# Patient Record
Sex: FEMALE | Race: WHITE | NOT HISPANIC OR LATINO
[De-identification: names, ages, dates, MRNs, and addresses within clinical notes are randomized per-mention and may not be internally consistent; named-entity substitution may affect disease eponyms.]

---

## 2017-03-24 ENCOUNTER — APPOINTMENT (OUTPATIENT)
Dept: ORTHOPEDIC SURGERY | Facility: CLINIC | Age: 72
End: 2017-03-24

## 2017-05-24 ENCOUNTER — APPOINTMENT (OUTPATIENT)
Dept: ORTHOPEDIC SURGERY | Facility: CLINIC | Age: 72
End: 2017-05-24

## 2017-06-12 ENCOUNTER — APPOINTMENT (OUTPATIENT)
Dept: ORTHOPEDIC SURGERY | Facility: CLINIC | Age: 72
End: 2017-06-12

## 2017-09-20 ENCOUNTER — APPOINTMENT (OUTPATIENT)
Dept: ORTHOPEDIC SURGERY | Facility: CLINIC | Age: 72
End: 2017-09-20
Payer: MEDICARE

## 2017-09-20 PROCEDURE — 99214 OFFICE O/P EST MOD 30 MIN: CPT

## 2017-12-20 ENCOUNTER — APPOINTMENT (OUTPATIENT)
Dept: ORTHOPEDIC SURGERY | Facility: CLINIC | Age: 72
End: 2017-12-20
Payer: MEDICARE

## 2017-12-20 VITALS — HEART RATE: 76 BPM | SYSTOLIC BLOOD PRESSURE: 106 MMHG | DIASTOLIC BLOOD PRESSURE: 69 MMHG

## 2017-12-20 PROCEDURE — 99214 OFFICE O/P EST MOD 30 MIN: CPT

## 2018-02-05 ENCOUNTER — OUTPATIENT (OUTPATIENT)
Dept: OUTPATIENT SERVICES | Facility: HOSPITAL | Age: 73
LOS: 1 days | End: 2018-02-05

## 2018-02-12 ENCOUNTER — INPATIENT (INPATIENT)
Facility: HOSPITAL | Age: 73
LOS: 1 days | Discharge: HOME HEALTH | End: 2018-02-14
Payer: MEDICARE

## 2018-02-12 PROCEDURE — 73501 X-RAY EXAM HIP UNI 1 VIEW: CPT | Mod: 26,RT

## 2018-02-13 ENCOUNTER — OUTPATIENT (OUTPATIENT)
Dept: OUTPATIENT SERVICES | Facility: HOSPITAL | Age: 73
LOS: 1 days | End: 2018-02-13

## 2018-03-21 ENCOUNTER — APPOINTMENT (OUTPATIENT)
Dept: ORTHOPEDIC SURGERY | Facility: CLINIC | Age: 73
End: 2018-03-21

## 2018-03-28 ENCOUNTER — APPOINTMENT (OUTPATIENT)
Dept: ORTHOPEDIC SURGERY | Facility: CLINIC | Age: 73
End: 2018-03-28
Payer: MEDICARE

## 2018-03-28 PROCEDURE — 99214 OFFICE O/P EST MOD 30 MIN: CPT

## 2018-06-20 ENCOUNTER — APPOINTMENT (OUTPATIENT)
Dept: ORTHOPEDIC SURGERY | Facility: CLINIC | Age: 73
End: 2018-06-20
Payer: MEDICARE

## 2018-06-20 PROCEDURE — 99214 OFFICE O/P EST MOD 30 MIN: CPT

## 2018-11-14 ENCOUNTER — APPOINTMENT (OUTPATIENT)
Dept: ORTHOPEDIC SURGERY | Facility: CLINIC | Age: 73
End: 2018-11-14
Payer: MEDICARE

## 2018-11-14 VITALS
BODY MASS INDEX: 38.98 KG/M2 | HEIGHT: 63 IN | HEART RATE: 80 BPM | WEIGHT: 220 LBS | DIASTOLIC BLOOD PRESSURE: 73 MMHG | SYSTOLIC BLOOD PRESSURE: 116 MMHG

## 2018-11-14 PROCEDURE — 99214 OFFICE O/P EST MOD 30 MIN: CPT

## 2019-04-17 ENCOUNTER — APPOINTMENT (OUTPATIENT)
Dept: ORTHOPEDIC SURGERY | Facility: CLINIC | Age: 74
End: 2019-04-17
Payer: MEDICARE

## 2019-04-17 PROCEDURE — 99214 OFFICE O/P EST MOD 30 MIN: CPT

## 2019-10-23 ENCOUNTER — APPOINTMENT (OUTPATIENT)
Dept: ORTHOPEDIC SURGERY | Facility: CLINIC | Age: 74
End: 2019-10-23
Payer: MEDICARE

## 2019-10-23 PROCEDURE — 99214 OFFICE O/P EST MOD 30 MIN: CPT

## 2019-10-23 NOTE — HISTORY OF PRESENT ILLNESS
[Pain] : pain [5] : currently ~his/her~ pain is 5 out of 10 [Worsening] : worsening [All Other ROS Normal] : All other review of systems are negative except as noted [All Hx] : past medical, family, and social [All] : medication and allergy [FreeTextEntry1] : Neck discomfort [FreeTextEntry2] : Ms. Altman returns for followup.  Overall she is feeling well.  Her back has been feeling good.

## 2019-10-23 NOTE — PHYSICAL EXAM
[Poor Appearance] : well-appearing [Acute Distress] : not in acute distress [Obese] : not obese [Antalgic] : not antalgic [FreeTextEntry2] : Examination of the cervical spine reveals no midline or paraspinal tenderness to palpation. No cervical lymphadenopathy. Decreased range of motion with respect to flexion, extension, rotation, and lateral bending. Negative Spurlings. Negative Lhermitte's. Full range of motion bilateral shoulders without evidence of impingement. Cranial nerves II through XII grossly intact. Intact sensation bilateral upper extremities. She does have some decreased strength of her right bicep and tricep which are 4/5 as well as decreased  strength which is 5-//5. 1+ biceps triceps and brachioradialis reflexes. Negative Ramesh's. 2+ radial pulse. Negative Tinel's over the cubital and carpal tunnel. No skin lesions on the right and left upper extremities.\par Examination of the thoracic and lumbar spine reveals no midline tenderness palpation, step-offs, or skin lesions. Decreased range of motion with respect to flexion, extension, lateral bending, and rotation. No tenderness to palpation of the sciatic notch. No tenderness palpation of the bilateral greater trochanters. No pain with passive internal/external rotation of the hips. Negative VENANCIO. Negative straight leg raise bilaterally. No bowstring. Negative femoral stretch. 5 out of 5 iliopsoas, hip abductors, hips adductors, quadriceps, hamstrings, gastrocsoleus, tibialis anterior, extensor hallucis longus, peroneals. Grossly intact sensation to light touch bilateral lower extremities. 1+ patellar and Achilles reflexes. Downgoing Babinski. No clonus. Intact proprioception. Palpable pulses. No skin lesion and no edema on the right and left lower extremities. [de-identified] : Recent PET scan does not does demonstrate gross change in bony metastases

## 2019-10-23 NOTE — DISCUSSION/SUMMARY
[de-identified] : Mr. her spine overall stable based on her PET scan. She does have some symptomatology of her right hand that appears consistent with some carpal tunnel or peripheral neuropathy. I recommend hand surgery evaluation for this. Follow up afterwards.

## 2019-11-05 ENCOUNTER — APPOINTMENT (OUTPATIENT)
Dept: ORTHOPEDIC SURGERY | Facility: CLINIC | Age: 74
End: 2019-11-05
Payer: MEDICARE

## 2019-11-05 VITALS
DIASTOLIC BLOOD PRESSURE: 58 MMHG | HEIGHT: 61 IN | HEART RATE: 101 BPM | BODY MASS INDEX: 33.99 KG/M2 | SYSTOLIC BLOOD PRESSURE: 99 MMHG | WEIGHT: 180 LBS

## 2019-11-05 DIAGNOSIS — Z78.9 OTHER SPECIFIED HEALTH STATUS: ICD-10-CM

## 2019-11-05 DIAGNOSIS — G56.01 CARPAL TUNNEL SYNDROME, RIGHT UPPER LIMB: ICD-10-CM

## 2019-11-05 DIAGNOSIS — M18.11 UNILATERAL PRIMARY OSTEOARTHRITIS OF FIRST CARPOMETACARPAL JOINT, RIGHT HAND: ICD-10-CM

## 2019-11-05 DIAGNOSIS — Z85.830 PERSONAL HISTORY OF MALIGNANT NEOPLASM OF BONE: ICD-10-CM

## 2019-11-05 PROCEDURE — 73110 X-RAY EXAM OF WRIST: CPT | Mod: RT

## 2019-11-05 PROCEDURE — 99214 OFFICE O/P EST MOD 30 MIN: CPT

## 2019-11-05 RX ORDER — MELOXICAM 7.5 MG/1
7.5 TABLET ORAL DAILY
Qty: 30 | Refills: 3 | Status: ACTIVE | COMMUNITY
Start: 2019-11-05 | End: 1900-01-01

## 2020-02-19 ENCOUNTER — APPOINTMENT (OUTPATIENT)
Dept: ORTHOPEDIC SURGERY | Facility: CLINIC | Age: 75
End: 2020-02-19
Payer: MEDICARE

## 2020-02-19 PROCEDURE — 99214 OFFICE O/P EST MOD 30 MIN: CPT

## 2020-02-19 NOTE — HISTORY OF PRESENT ILLNESS
[Worsening] : worsening [Pain] : pain [All Hx] : past medical, family, and social [All Other ROS Normal] : All other review of systems are negative except as noted [5] : currently ~his/her~ pain is 5 out of 10 [All] : medication and allergy [FreeTextEntry1] : Neck discomfort [FreeTextEntry2] : Ms. Altman returns for followup.  Overall she is feeling well.  Her back has been feeling good. Patient has been having pain at her neck which radiates to shoulders. Was seen by her oncologist which recommended a PET and bone scan. Bone scan was positive for new focus at the body of sternum, but otherwise stable mets (non involving neck or shoulder). Patient also had PET scan done.

## 2020-02-19 NOTE — DISCUSSION/SUMMARY
[de-identified] : Given her worsening symptoms an MRI of her cervical spine will be obtained. Follow up afterwards.

## 2020-02-19 NOTE — PHYSICAL EXAM
[Poor Appearance] : well-appearing [Acute Distress] : not in acute distress [Obese] : not obese [Antalgic] : not antalgic [FreeTextEntry2] : Examination of the cervical spine reveals no midline or paraspinal tenderness to palpation. No cervical lymphadenopathy. Decreased range of motion with respect to flexion, extension, rotation, and lateral bending. Negative Spurlings. Negative Lhermitte's. Full range of motion bilateral shoulders without evidence of impingement. Cranial nerves II through XII grossly intact. Intact sensation bilateral upper extremities. She does have some decreased strength of her right bicep and tricep which are 4/5 as well as decreased  strength which is 5-//5. 1+ biceps triceps and brachioradialis reflexes. Negative Ramesh's. 2+ radial pulse. Negative Tinel's over the cubital and carpal tunnel. No skin lesions on the right and left upper extremities.\par Examination of the thoracic and lumbar spine reveals no midline tenderness palpation, step-offs, or skin lesions. Decreased range of motion with respect to flexion, extension, lateral bending, and rotation. No tenderness to palpation of the sciatic notch. No tenderness palpation of the bilateral greater trochanters. No pain with passive internal/external rotation of the hips. Negative VENANCIO. Negative straight leg raise bilaterally. No bowstring. Negative femoral stretch. 5 out of 5 iliopsoas, hip abductors, hips adductors, quadriceps, hamstrings, gastrocsoleus, tibialis anterior, extensor hallucis longus, peroneals. Grossly intact sensation to light touch bilateral lower extremities. 1+ patellar and Achilles reflexes. Downgoing Babinski. No clonus. Intact proprioception. Palpable pulses. No skin lesion and no edema on the right and left lower extremities. [de-identified] : Recent PET scan does not does demonstrate gross change in bony metastases\par \par DATE OF EXAM: 02/13/2020\par NM PET/CT SKULL BASE TO MID THIGH\par FINDINGS: SUV is normalized to body weight and is reported as SUV maximum. Liver\par background SUV mean/average, as a reference for comparing FDG studies is 2.6. Aortic blood\par pool baseline activity, as a reference for comparing relative uptake, SUV mean/average\par 2.0.\par Head/Neck: Physiologic activity in the visualized regions of the brain, major salivary\par glands and pharynx. Neck laryngeal and thyroid gland uptake within normal limits.\par Multinodular thyroid. No evidence of cervical atypical lymph node activity or enlargement\par identified.\par Chest: Lung fields without abnormal localized activity or newly developing pulmonary\par nodule, consolidation or infiltrate. No suspicious focal hilar and mediastinal lymph node\par atypical uptake or enlargement. Normal variable physiologic uptake within the heart and\par great vessels. No discrete supraclavicular, axillary or chest wall activity noted. Left\par breast post treatment nonfocal low-level uptake.\par Abdomen: Hepatobiliary system homogeneous activity without suspicious focal uptake.\par Moderate diffuse fatty infiltration of the liver. Hepatosplenic uptake max SUV ratio is\par 4.0/4.2. Splenic activity within normal limits. Pancreas, adrenal glands and kidneys\par unremarkable. Small left renal cyst. Atherosclerotic nonaneurysmal distal ectatic aorta\par 2.3 cm. Remainder of retroperitoneal and mesenteric structures are normal. Bowel,\par including stomach, duodenum, small bowel and large bowel demonstrating normal variable\par physiologic activity.\par Pelvis: Normal radiotracer distribution throughout the visualized visceral organs, lymph\par Page 2 of 2\par node chains and remainder of the bowel structures.\par Musculoskeletal: Visualized osseous structures reveal multiple scattered sclerotic\par densities with variable heterogeneous uptake representative C2 max SUV from 2.9 up to 3.5,\par T3 max SUV from 3.7 up to 4.8, left anterior fifth rib max SUV from 4.5 up to 5.0, L2 max\par SUV from 9.8 down to 8.7, left posterolateral L4 max SUV from 4.1 up to 4.8 and left iliac\par spine max SUV from 2.6 up to 3.1, left posterior acetabulum max SUV from 4.5 up to 4.7.\par Bilateral total hip replacements. Overlying muscular soft tissues demonstrate normal\par physiologic distribution of activity with right subscapular exertional uptake.\par IMPRESSION:\par 1. Mild continued interval progression in scattered osseous hypermetabolic foci since\par 7/11/2019.\par \par EXAM: 02/07/2020\par NM BONE SCAN TOTAL BODY\par SCINTIGRAPHIC FINDINGS: There are again multiple areas of increased uptake.\par Findings at the calvarium at the vertex and LEFT occiput appears stable.\par LEFT glenoid, distal humerus and clavicle are stable. There is a NEW focus involving the\par body of the sternum.\par Previously evident BILATERAL rib foci are largely unchanged.\par Abnormalities at multiple levels of the thoracic and lumbar spine with the largest lumbar\par focus at L3 are unchanged.\par Abnormalities pelvic bones including the pelvic bones and proximal lower extremities RIGHT\par hemisacrum, foci involving LEFT ischium and BILATERAL trochanteric and femoral neck\par regions, BILATERAL distal femora appear largely unchanged.\par Uptake suggestive of degenerative joint disease is seen in shoulders. There are findings\par suggestive of dental disease. There is NO abnormal soft tissue activity. There is surface\par contamination of radiotracer at the perineum. Physiologic excretion is seen in the kidneys\par and urinary bladder.\par IMPRESSION: There is a NEW focus involving the body of the sternum. Otherwise stable\par osseous metastases involving the axial and appendicular skeleton elsewhere.

## 2020-02-24 ENCOUNTER — APPOINTMENT (OUTPATIENT)
Dept: ORTHOPEDIC SURGERY | Facility: CLINIC | Age: 75
End: 2020-02-24
Payer: MEDICARE

## 2020-02-24 PROCEDURE — 99214 OFFICE O/P EST MOD 30 MIN: CPT

## 2020-02-24 RX ORDER — PREDNISONE 20 MG/1
20 TABLET ORAL DAILY
Qty: 18 | Refills: 0 | Status: ACTIVE | COMMUNITY
Start: 2020-02-24 | End: 1900-01-01

## 2020-02-24 NOTE — DISCUSSION/SUMMARY
[de-identified] : I made it feel at this point appears to be more cervical radiculopathy. She will try prednisone taper. Should this fail to alleviate her symptoms she may be a candidate for epidural injections

## 2020-02-24 NOTE — PHYSICAL EXAM
[Poor Appearance] : well-appearing [Obese] : not obese [Acute Distress] : not in acute distress [Antalgic] : not antalgic [FreeTextEntry2] : Examination of the cervical spine reveals no midline or paraspinal tenderness to palpation. No cervical lymphadenopathy. Decreased range of motion with respect to flexion, extension, rotation, and lateral bending. Negative Spurlings. Negative Lhermitte's. Full range of motion bilateral shoulders without evidence of impingement. Cranial nerves II through XII grossly intact. Intact sensation bilateral upper extremities. She does have some decreased strength of her right bicep and tricep which are 4/5 as well as decreased  strength which is 5-//5. 1+ biceps triceps and brachioradialis reflexes. Negative Ramesh's. 2+ radial pulse. Negative Tinel's over the cubital and carpal tunnel. No skin lesions on the right and left upper extremities.\par Examination of the thoracic and lumbar spine reveals no midline tenderness palpation, step-offs, or skin lesions. Decreased range of motion with respect to flexion, extension, lateral bending, and rotation. No tenderness to palpation of the sciatic notch. No tenderness palpation of the bilateral greater trochanters. No pain with passive internal/external rotation of the hips. Negative VENANCIO. Negative straight leg raise bilaterally. No bowstring. Negative femoral stretch. 5 out of 5 iliopsoas, hip abductors, hips adductors, quadriceps, hamstrings, gastrocsoleus, tibialis anterior, extensor hallucis longus, peroneals. Grossly intact sensation to light touch bilateral lower extremities. 1+ patellar and Achilles reflexes. Downgoing Babinski. No clonus. Intact proprioception. Palpable pulses. No skin lesion and no edema on the right and left lower extremities. [de-identified] : MRI of the cervical spine reveals increased stenosis at C4-5. Stable C2 metastatic lesion with questionable increased size at T2 but no obvious epidural extension

## 2020-02-24 NOTE — HISTORY OF PRESENT ILLNESS
[Pain] : pain [Worsening] : worsening [5] : currently ~his/her~ pain is 5 out of 10 [All Other ROS Normal] : All other review of systems are negative except as noted [All Hx] : past medical, family, and social [All] : medication and allergy [FreeTextEntry1] : Neck discomfort [FreeTextEntry2] : Ms. Altman returns for followup.  Overall she is feeling well.  Her back has been feeling good. Patient has been having pain at her neck which radiates to shoulders. Was seen by her oncologist which recommended a PET and bone scan. Bone scan was positive for new focus at the body of sternum, but otherwise stable mets (non involving neck or shoulder). Patient also had PET scan done.

## 2020-04-08 ENCOUNTER — APPOINTMENT (OUTPATIENT)
Dept: ORTHOPEDIC SURGERY | Facility: CLINIC | Age: 75
End: 2020-04-08

## 2020-06-12 ENCOUNTER — APPOINTMENT (OUTPATIENT)
Dept: ORTHOPEDIC SURGERY | Facility: CLINIC | Age: 75
End: 2020-06-12
Payer: MEDICARE

## 2020-06-12 VITALS
WEIGHT: 180 LBS | HEART RATE: 89 BPM | DIASTOLIC BLOOD PRESSURE: 63 MMHG | SYSTOLIC BLOOD PRESSURE: 95 MMHG | HEIGHT: 61 IN | BODY MASS INDEX: 33.99 KG/M2

## 2020-06-12 VITALS — TEMPERATURE: 99.8 F

## 2020-06-12 PROCEDURE — 99214 OFFICE O/P EST MOD 30 MIN: CPT

## 2020-06-12 NOTE — HISTORY OF PRESENT ILLNESS
[de-identified] : Patient follows up for her cervical spine.\par Last seen in office on 2/24/2020 when she was given a Prednisone taper for cervical radiculopathy and stable C4-5 stenosis.\par She said this medication was taken for about 7-10 days, and noted great improvements of her pain when taking it.\par Her pain has since returned though. She denies any new symptoms or complaints.

## 2020-06-12 NOTE — DISCUSSION/SUMMARY
[de-identified] : She will be evaluated for possible epidural injections.  Follow-up afterwards.he will be evaluated for possible epidural injections. Follow up afterwards.

## 2020-06-12 NOTE — PHYSICAL EXAM
[Poor Appearance] : well-appearing [Acute Distress] : not in acute distress [Obese] : not obese [Antalgic] : not antalgic [FreeTextEntry2] : Examination of the cervical spine reveals no midline or paraspinal tenderness to palpation. No cervical lymphadenopathy. Decreased range of motion with respect to flexion, extension, rotation, and lateral bending. Negative Spurlings. Negative Lhermitte's. Full range of motion bilateral shoulders without evidence of impingement. Cranial nerves II through XII grossly intact. Intact sensation bilateral upper extremities. She does have some decreased strength of her right bicep and tricep which are 4/5 as well as decreased  strength which is 5-//5. 1+ biceps triceps and brachioradialis reflexes. Negative Ramesh's. 2+ radial pulse. Negative Tinel's over the cubital and carpal tunnel. No skin lesions on the right and left upper extremities.\par Examination of the thoracic and lumbar spine reveals no midline tenderness palpation, step-offs, or skin lesions. Decreased range of motion with respect to flexion, extension, lateral bending, and rotation. No tenderness to palpation of the sciatic notch. No tenderness palpation of the bilateral greater trochanters. No pain with passive internal/external rotation of the hips. Negative VENANCIO. Negative straight leg raise bilaterally. No bowstring. Negative femoral stretch. 5 out of 5 iliopsoas, hip abductors, hips adductors, quadriceps, hamstrings, gastrocsoleus, tibialis anterior, extensor hallucis longus, peroneals. Grossly intact sensation to light touch bilateral lower extremities. 1+ patellar and Achilles reflexes. Downgoing Babinski. No clonus. Intact proprioception. Palpable pulses. No skin lesion and no edema on the right and left lower extremities. [de-identified] : MRI of the cervical spine reveals increased stenosis at C4-5. Stable C2 metastatic lesion with questionable increased size at T2 but no obvious epidural extension

## 2020-09-14 ENCOUNTER — APPOINTMENT (OUTPATIENT)
Dept: ORTHOPEDIC SURGERY | Facility: CLINIC | Age: 75
End: 2020-09-14
Payer: MEDICARE

## 2020-09-14 VITALS — WEIGHT: 180 LBS | TEMPERATURE: 98.2 F | HEIGHT: 61 IN | BODY MASS INDEX: 33.99 KG/M2

## 2020-09-14 PROCEDURE — 99214 OFFICE O/P EST MOD 30 MIN: CPT

## 2020-09-14 NOTE — PHYSICAL EXAM
[Poor Appearance] : well-appearing [Obese] : not obese [Acute Distress] : not in acute distress [Antalgic] : not antalgic [FreeTextEntry2] : Examination of the cervical spine reveals no midline or paraspinal tenderness to palpation. No cervical lymphadenopathy. Decreased range of motion with respect to flexion, extension, rotation, and lateral bending. Negative Spurlings. Negative Lhermitte's. Full range of motion bilateral shoulders without evidence of impingement. Cranial nerves II through XII grossly intact. Intact sensation bilateral upper extremities.  She has overall improved strength in her arm.  1+ biceps triceps and brachioradialis reflexes. Negative Ramesh's. 2+ radial pulse. Negative Tinel's over the cubital and carpal tunnel. No skin lesions on the right and left upper extremities.\par Examination of the thoracic and lumbar spine reveals no midline tenderness palpation, step-offs, or skin lesions. Decreased range of motion with respect to flexion, extension, lateral bending, and rotation. No tenderness to palpation of the sciatic notch. No tenderness palpation of the bilateral greater trochanters. No pain with passive internal/external rotation of the hips. Negative VENANCIO. Negative straight leg raise bilaterally. No bowstring. Negative femoral stretch. 5 out of 5 iliopsoas, hip abductors, hips adductors, quadriceps, hamstrings, gastrocsoleus, tibialis anterior, extensor hallucis longus, peroneals. Grossly intact sensation to light touch bilateral lower extremities. 1+ patellar and Achilles reflexes. Downgoing Babinski. No clonus. Intact proprioception. Palpable pulses. No skin lesion and no edema on the right and left lower extremities. [de-identified] : MRI of the cervical spine reveals increased stenosis at C4-5. Stable C2 metastatic lesion with questionable increased size at T2 but no obvious epidural extension\par \par Review of her recent PET scan demonstrates stable osseous metastasis

## 2020-09-14 NOTE — DISCUSSION/SUMMARY
[de-identified] : We discussed further treatment options.  Overall her symptoms are stable and improved with the epidural injections.  She will follow-up with me in 3 to 6 months.  She will let me know of any interval changes or worsening of her symptoms.

## 2020-09-14 NOTE — HISTORY OF PRESENT ILLNESS
[de-identified] : Ms. Altman presents to the office for a follow-up visit.   She has done 2 RACIEL's for her neck.  She feels about 40 % better.  She continues to have tingling and numbness down her left arm and has difficulty using her left hand.  She has done a recent bone and PET scan which are stable.  She is being treated for an infection in her mouth.

## 2020-12-09 ENCOUNTER — APPOINTMENT (OUTPATIENT)
Dept: ORTHOPEDIC SURGERY | Facility: CLINIC | Age: 75
End: 2020-12-09
Payer: MEDICARE

## 2020-12-09 VITALS — HEART RATE: 83 BPM | DIASTOLIC BLOOD PRESSURE: 70 MMHG | SYSTOLIC BLOOD PRESSURE: 109 MMHG

## 2020-12-09 VITALS — TEMPERATURE: 96.7 F

## 2020-12-09 PROCEDURE — 99214 OFFICE O/P EST MOD 30 MIN: CPT

## 2020-12-09 NOTE — PHYSICAL EXAM
[Poor Appearance] : well-appearing [Acute Distress] : not in acute distress [Obese] : not obese [Ataxic] : ataxic [Antalgic] : not antalgic [FreeTextEntry2] : Examination of the cervical spine reveals no midline or paraspinal tenderness to palpation. No cervical lymphadenopathy. Decreased range of motion with respect to flexion, extension, rotation, and lateral bending. Negative Spurlings. Negative Lhermitte's. Full range of motion bilateral shoulders without evidence of impingement. Cranial nerves II through XII grossly intact. Intact sensation bilateral upper extremities.  On today's examination she appears to have decreased right-sided  strength and biceps.  1+ biceps triceps and brachioradialis reflexes.  Positive Ramesh's. 2+ radial pulse. Negative Tinel's over the cubital and carpal tunnel. No skin lesions on the right and left upper extremities.\par Examination of the thoracic and lumbar spine reveals no midline tenderness palpation, step-offs, or skin lesions. Decreased range of motion with respect to flexion, extension, lateral bending, and rotation. No tenderness to palpation of the sciatic notch. No tenderness palpation of the bilateral greater trochanters. No pain with passive internal/external rotation of the hips. Negative VENANCIO. Negative straight leg raise bilaterally. No bowstring. Negative femoral stretch. 5 out of 5 iliopsoas, hip abductors, hips adductors, quadriceps, hamstrings, gastrocsoleus, tibialis anterior, extensor hallucis longus, peroneals. Grossly intact sensation to light touch bilateral lower extremities. 1+ patellar and Achilles reflexes. Downgoing Babinski. No clonus. Intact proprioception. Palpable pulses. No skin lesion and no edema on the right and left lower extremities. [de-identified] : MRI of the cervical spine reveals increased stenosis at C4-5. Stable C2 metastatic lesion with questionable increased size at T2 but no obvious epidural extension\par \par Review of her recent PET scan demonstrates stable osseous metastasis

## 2020-12-09 NOTE — HISTORY OF PRESENT ILLNESS
[de-identified] : Ms. Altman presents to the office for a follow-up visit. She continues to have tingling and numbness down her right arm.  At times her hand will feel completely numb.  When she wakes up in the morning her right arm feels heavy.

## 2020-12-09 NOTE — DISCUSSION/SUMMARY
[de-identified] : Given her worsening symptoms I have recommended an updated cervical spine MRI.  Follow-up afterwards.

## 2020-12-17 ENCOUNTER — APPOINTMENT (OUTPATIENT)
Dept: ORTHOPEDIC SURGERY | Facility: CLINIC | Age: 75
End: 2020-12-17
Payer: MEDICARE

## 2020-12-17 PROCEDURE — 99442: CPT | Mod: 95

## 2021-01-07 ENCOUNTER — OUTPATIENT (OUTPATIENT)
Dept: OUTPATIENT SERVICES | Facility: HOSPITAL | Age: 76
LOS: 1 days | End: 2021-01-07
Payer: MEDICARE

## 2021-01-07 VITALS
HEIGHT: 64.5 IN | WEIGHT: 188.05 LBS | RESPIRATION RATE: 16 BRPM | DIASTOLIC BLOOD PRESSURE: 72 MMHG | TEMPERATURE: 97 F | OXYGEN SATURATION: 99 % | HEART RATE: 80 BPM | SYSTOLIC BLOOD PRESSURE: 116 MMHG

## 2021-01-07 DIAGNOSIS — M48.02 SPINAL STENOSIS, CERVICAL REGION: ICD-10-CM

## 2021-01-07 DIAGNOSIS — Z98.890 OTHER SPECIFIED POSTPROCEDURAL STATES: Chronic | ICD-10-CM

## 2021-01-07 DIAGNOSIS — Z90.710 ACQUIRED ABSENCE OF BOTH CERVIX AND UTERUS: Chronic | ICD-10-CM

## 2021-01-07 DIAGNOSIS — Z96.649 PRESENCE OF UNSPECIFIED ARTIFICIAL HIP JOINT: Chronic | ICD-10-CM

## 2021-01-07 DIAGNOSIS — Z98.49 CATARACT EXTRACTION STATUS, UNSPECIFIED EYE: Chronic | ICD-10-CM

## 2021-01-07 DIAGNOSIS — I10 ESSENTIAL (PRIMARY) HYPERTENSION: ICD-10-CM

## 2021-01-07 LAB
ANION GAP SERPL CALC-SCNC: 10 MMOL/L — SIGNIFICANT CHANGE UP (ref 7–14)
BLD GP AB SCN SERPL QL: NEGATIVE — SIGNIFICANT CHANGE UP
BUN SERPL-MCNC: 18 MG/DL — SIGNIFICANT CHANGE UP (ref 7–23)
CALCIUM SERPL-MCNC: 10.5 MG/DL — SIGNIFICANT CHANGE UP (ref 8.4–10.5)
CHLORIDE SERPL-SCNC: 99 MMOL/L — SIGNIFICANT CHANGE UP (ref 98–107)
CO2 SERPL-SCNC: 30 MMOL/L — SIGNIFICANT CHANGE UP (ref 22–31)
CREAT SERPL-MCNC: 1.34 MG/DL — HIGH (ref 0.5–1.3)
GLUCOSE SERPL-MCNC: 113 MG/DL — HIGH (ref 70–99)
HCT VFR BLD CALC: 30.1 % — LOW (ref 34.5–45)
HGB BLD-MCNC: 10.2 G/DL — LOW (ref 11.5–15.5)
MCHC RBC-ENTMCNC: 33.8 PG — SIGNIFICANT CHANGE UP (ref 27–34)
MCHC RBC-ENTMCNC: 33.9 GM/DL — SIGNIFICANT CHANGE UP (ref 32–36)
MCV RBC AUTO: 99.7 FL — SIGNIFICANT CHANGE UP (ref 80–100)
NRBC # BLD: 0 /100 WBCS — SIGNIFICANT CHANGE UP
NRBC # FLD: 0 K/UL — SIGNIFICANT CHANGE UP
PLATELET # BLD AUTO: 251 K/UL — SIGNIFICANT CHANGE UP (ref 150–400)
POTASSIUM SERPL-MCNC: 3.5 MMOL/L — SIGNIFICANT CHANGE UP (ref 3.5–5.3)
POTASSIUM SERPL-SCNC: 3.5 MMOL/L — SIGNIFICANT CHANGE UP (ref 3.5–5.3)
RBC # BLD: 3.02 M/UL — LOW (ref 3.8–5.2)
RBC # FLD: 13.3 % — SIGNIFICANT CHANGE UP (ref 10.3–14.5)
RH IG SCN BLD-IMP: POSITIVE — SIGNIFICANT CHANGE UP
SODIUM SERPL-SCNC: 139 MMOL/L — SIGNIFICANT CHANGE UP (ref 135–145)
WBC # BLD: 4.07 K/UL — SIGNIFICANT CHANGE UP (ref 3.8–10.5)
WBC # FLD AUTO: 4.07 K/UL — SIGNIFICANT CHANGE UP (ref 3.8–10.5)

## 2021-01-07 PROCEDURE — 93010 ELECTROCARDIOGRAM REPORT: CPT

## 2021-01-07 RX ORDER — METOPROLOL TARTRATE 50 MG
1 TABLET ORAL
Qty: 0 | Refills: 0 | DISCHARGE

## 2021-01-07 RX ORDER — SODIUM CHLORIDE 9 MG/ML
3 INJECTION INTRAMUSCULAR; INTRAVENOUS; SUBCUTANEOUS EVERY 8 HOURS
Refills: 0 | Status: DISCONTINUED | OUTPATIENT
Start: 2021-01-19 | End: 2021-01-22

## 2021-01-07 RX ORDER — SODIUM CHLORIDE 9 MG/ML
1000 INJECTION, SOLUTION INTRAVENOUS
Refills: 0 | Status: DISCONTINUED | OUTPATIENT
Start: 2021-01-19 | End: 2021-01-20

## 2021-01-07 NOTE — H&P PST ADULT - NSICDXPASTMEDICALHX_GEN_ALL_CORE_FT
PAST MEDICAL HISTORY:  Cancer of spine     HTN (hypertension)     Spinal stenosis, cervical region

## 2021-01-07 NOTE — H&P PST ADULT - HISTORY OF PRESENT ILLNESS
76 y/o female HTN presents to PST preop for C3-6 laminectomy with fusion on 1/19/21. preop dx of spinal stenosis, cervical region.   pt diagnosed with spine cancer 3 years ago. s/p multiple radiation treatments. pt states a recent MRI revealed worsening spinal stenosis. now for surgery.  pt reports chronic neck pain that radiates down her right arm causing numbness, tingling and weakness

## 2021-01-07 NOTE — H&P PST ADULT - NSICDXPROBLEM_GEN_ALL_CORE_FT
PROBLEM DIAGNOSES  Problem: Spinal stenosis, cervical region  Assessment and Plan: preop for c3-6 laminectomy and fusion on 1/19/21  preop instructions given, pt verbalized understanding  GI prophylaxis and chlorhexidine wash provided  pt is scheduled for COVID testing preop  medical clearance requested- pt will see her PCP on 1/13/21 for MC      Problem: HTN (hypertension)  Assessment and Plan: pt will take blood pressure meds Lisinorpil  AM of surgery as prescribed

## 2021-01-07 NOTE — H&P PST ADULT - NEGATIVE NEUROLOGICAL SYMPTOMS
no transient paralysis/no generalized seizures/no syncope/no tremors/no vertigo/no loss of sensation/no difficulty walking/no headache/no loss of consciousness/no hemiparesis/no confusion/no facial palsy

## 2021-01-07 NOTE — H&P PST ADULT - NSICDXPASTSURGICALHX_GEN_ALL_CORE_FT
PAST SURGICAL HISTORY:  H/O carpal tunnel repair left    H/O eye surgery repair of hole in left retina    History of back surgery lumbar spine    S/P cataract extraction b/l    S/P hip replacement b/l    S/P hysterectomy

## 2021-01-08 LAB
MRSA PCR RESULT.: SIGNIFICANT CHANGE UP
S AUREUS DNA NOSE QL NAA+PROBE: DETECTED

## 2021-01-11 ENCOUNTER — APPOINTMENT (OUTPATIENT)
Dept: ORTHOPEDIC SURGERY | Facility: CLINIC | Age: 76
End: 2021-01-11
Payer: MEDICARE

## 2021-01-11 PROCEDURE — 99214 OFFICE O/P EST MOD 30 MIN: CPT

## 2021-01-11 PROCEDURE — 72040 X-RAY EXAM NECK SPINE 2-3 VW: CPT

## 2021-01-11 NOTE — PHYSICAL EXAM
[de-identified] : General: well-appearing, not in acute distress and not obese. \par Gait: ataxic, but not antalgic. \par Spine: Examination of the cervical spine reveals no midline or paraspinal tenderness to palpation. No cervical lymphadenopathy. Decreased range of motion with respect to flexion, extension, rotation, and lateral bending. Negative Spurlings. Negative Lhermitte's. Full range of motion bilateral shoulders without evidence of impingement. Cranial nerves II through XII grossly intact. Intact sensation bilateral upper extremities. On today's examination she appears to have decreased right-sided  strength and biceps. 1+ biceps triceps and brachioradialis reflexes. Positive Ramesh's. 2+ radial pulse. Negative Tinel's over the cubital and carpal tunnel. No skin lesions on the right and left upper extremities.\par Examination of the thoracic and lumbar spine reveals no midline tenderness palpation, step-offs, or skin lesions. Decreased range of motion with respect to flexion, extension, lateral bending, and rotation. No tenderness to palpation of the sciatic notch. No tenderness palpation of the bilateral greater trochanters. No pain with passive internal/external rotation of the hips. Negative VENANCIO. Negative straight leg raise bilaterally. No bowstring. Negative femoral stretch. 5 out of 5 iliopsoas, hip abductors, hips adductors, quadriceps, hamstrings, gastrocsoleus, tibialis anterior, extensor hallucis longus, peroneals. Grossly intact sensation to light touch bilateral lower extremities. 1+ patellar and Achilles reflexes. Downgoing Babinski. No clonus. Intact proprioception. Palpable pulses. No skin lesion and no edema on the right and left lower extremities.  [de-identified] : MRI of the cervical spine reveals increased stenosis at C4-5. Stable C2 metastatic lesion with questionable increased size at T2 but no obvious epidural extension\par \par Review of her recent PET scan demonstrates stable osseous metastasis. \par \par AP lateral cervical x-rays reveals preserved lordosis

## 2021-01-11 NOTE — HISTORY OF PRESENT ILLNESS
[de-identified] : Ms. Altman presents to the office for a follow-up visit. She continues to have tingling and numbness down her right arm. At times her hand will feel completely numb. When she wakes up in the morning her right arm feels heavy.

## 2021-01-11 NOTE — DISCUSSION/SUMMARY
[de-identified] : We again discussed the nature and purpose of a posterior cervical decompression fusion for treatment of her symptoms.  Risks of cervical surgery were discussed including but not limited to bleeding, infection, pain, damage to nerves and vessels, continued pain, continued weakness, blindness, paralysis, dural injury, hardware related complications, pseudoarthrosis, dysphagia, wound healing complications, CSF leak, need for further procedures, PE/DVT, GI, , pulmonary, and cardiac complications,  anesthetic risks, and death.

## 2021-01-15 DIAGNOSIS — Z01.818 ENCOUNTER FOR OTHER PREPROCEDURAL EXAMINATION: ICD-10-CM

## 2021-01-16 ENCOUNTER — APPOINTMENT (OUTPATIENT)
Dept: DISASTER EMERGENCY | Facility: CLINIC | Age: 76
End: 2021-01-16

## 2021-01-18 ENCOUNTER — TRANSCRIPTION ENCOUNTER (OUTPATIENT)
Age: 76
End: 2021-01-18

## 2021-01-18 LAB — SARS-COV-2 N GENE NPH QL NAA+PROBE: NOT DETECTED

## 2021-01-18 NOTE — ASU PATIENT PROFILE, ADULT - PSH
H/O carpal tunnel repair  left  H/O eye surgery  repair of hole in left retina  History of back surgery  lumbar spine  S/P cataract extraction  b/l  S/P hip replacement  b/l  S/P hysterectomy

## 2021-01-19 ENCOUNTER — RESULT REVIEW (OUTPATIENT)
Age: 76
End: 2021-01-19

## 2021-01-19 ENCOUNTER — APPOINTMENT (OUTPATIENT)
Dept: ORTHOPEDIC SURGERY | Facility: HOSPITAL | Age: 76
End: 2021-01-19
Payer: MEDICARE

## 2021-01-19 ENCOUNTER — INPATIENT (INPATIENT)
Facility: HOSPITAL | Age: 76
LOS: 2 days | Discharge: HOME | End: 2021-01-22
Attending: STUDENT IN AN ORGANIZED HEALTH CARE EDUCATION/TRAINING PROGRAM | Admitting: STUDENT IN AN ORGANIZED HEALTH CARE EDUCATION/TRAINING PROGRAM
Payer: MEDICARE

## 2021-01-19 VITALS
HEART RATE: 80 BPM | HEIGHT: 64.5 IN | RESPIRATION RATE: 16 BRPM | OXYGEN SATURATION: 96 % | DIASTOLIC BLOOD PRESSURE: 60 MMHG | SYSTOLIC BLOOD PRESSURE: 140 MMHG | TEMPERATURE: 98 F | WEIGHT: 188.05 LBS

## 2021-01-19 DIAGNOSIS — Z98.890 OTHER SPECIFIED POSTPROCEDURAL STATES: Chronic | ICD-10-CM

## 2021-01-19 DIAGNOSIS — M48.02 SPINAL STENOSIS, CERVICAL REGION: ICD-10-CM

## 2021-01-19 DIAGNOSIS — Z98.49 CATARACT EXTRACTION STATUS, UNSPECIFIED EYE: Chronic | ICD-10-CM

## 2021-01-19 DIAGNOSIS — Z90.710 ACQUIRED ABSENCE OF BOTH CERVIX AND UTERUS: Chronic | ICD-10-CM

## 2021-01-19 DIAGNOSIS — Z96.649 PRESENCE OF UNSPECIFIED ARTIFICIAL HIP JOINT: Chronic | ICD-10-CM

## 2021-01-19 LAB
ANION GAP SERPL CALC-SCNC: 13 MMOL/L — SIGNIFICANT CHANGE UP (ref 7–14)
BASOPHILS # BLD AUTO: 0.03 K/UL — SIGNIFICANT CHANGE UP (ref 0–0.2)
BASOPHILS NFR BLD AUTO: 0.8 % — SIGNIFICANT CHANGE UP (ref 0–2)
BUN SERPL-MCNC: 17 MG/DL — SIGNIFICANT CHANGE UP (ref 7–23)
CALCIUM SERPL-MCNC: 9.5 MG/DL — SIGNIFICANT CHANGE UP (ref 8.4–10.5)
CHLORIDE SERPL-SCNC: 102 MMOL/L — SIGNIFICANT CHANGE UP (ref 98–107)
CO2 SERPL-SCNC: 23 MMOL/L — SIGNIFICANT CHANGE UP (ref 22–31)
CREAT SERPL-MCNC: 1.17 MG/DL — SIGNIFICANT CHANGE UP (ref 0.5–1.3)
EOSINOPHIL # BLD AUTO: 0.03 K/UL — SIGNIFICANT CHANGE UP (ref 0–0.5)
EOSINOPHIL NFR BLD AUTO: 0.8 % — SIGNIFICANT CHANGE UP (ref 0–6)
GAS PNL BLDA: SIGNIFICANT CHANGE UP
GAS PNL BLDA: SIGNIFICANT CHANGE UP
GLUCOSE SERPL-MCNC: 186 MG/DL — HIGH (ref 70–99)
HCT VFR BLD CALC: 26.8 % — LOW (ref 34.5–45)
HGB BLD-MCNC: 9 G/DL — LOW (ref 11.5–15.5)
IANC: 3.43 K/UL — SIGNIFICANT CHANGE UP (ref 1.5–8.5)
IMM GRANULOCYTES NFR BLD AUTO: 1 % — SIGNIFICANT CHANGE UP (ref 0–1.5)
LYMPHOCYTES # BLD AUTO: 0.28 K/UL — LOW (ref 1–3.3)
LYMPHOCYTES # BLD AUTO: 7.1 % — LOW (ref 13–44)
MCHC RBC-ENTMCNC: 33.2 PG — SIGNIFICANT CHANGE UP (ref 27–34)
MCHC RBC-ENTMCNC: 33.6 GM/DL — SIGNIFICANT CHANGE UP (ref 32–36)
MCV RBC AUTO: 98.9 FL — SIGNIFICANT CHANGE UP (ref 80–100)
MONOCYTES # BLD AUTO: 0.15 K/UL — SIGNIFICANT CHANGE UP (ref 0–0.9)
MONOCYTES NFR BLD AUTO: 3.8 % — SIGNIFICANT CHANGE UP (ref 2–14)
NEUTROPHILS # BLD AUTO: 3.43 K/UL — SIGNIFICANT CHANGE UP (ref 1.8–7.4)
NEUTROPHILS NFR BLD AUTO: 86.5 % — HIGH (ref 43–77)
NRBC # BLD: 0 /100 WBCS — SIGNIFICANT CHANGE UP
NRBC # FLD: 0 K/UL — SIGNIFICANT CHANGE UP
PLATELET # BLD AUTO: 132 K/UL — LOW (ref 150–400)
POTASSIUM SERPL-MCNC: 3.7 MMOL/L — SIGNIFICANT CHANGE UP (ref 3.5–5.3)
POTASSIUM SERPL-SCNC: 3.7 MMOL/L — SIGNIFICANT CHANGE UP (ref 3.5–5.3)
RBC # BLD: 2.71 M/UL — LOW (ref 3.8–5.2)
RBC # FLD: 13.8 % — SIGNIFICANT CHANGE UP (ref 10.3–14.5)
RH IG SCN BLD-IMP: POSITIVE — SIGNIFICANT CHANGE UP
SODIUM SERPL-SCNC: 138 MMOL/L — SIGNIFICANT CHANGE UP (ref 135–145)
WBC # BLD: 3.96 K/UL — SIGNIFICANT CHANGE UP (ref 3.8–10.5)
WBC # FLD AUTO: 3.96 K/UL — SIGNIFICANT CHANGE UP (ref 3.8–10.5)

## 2021-01-19 PROCEDURE — 63045 LAM FACETEC & FORAMOT CRV: CPT | Mod: 82,59

## 2021-01-19 PROCEDURE — 22842 INSERT SPINE FIXATION DEVICE: CPT

## 2021-01-19 PROCEDURE — 22600 ARTHRD PST TQ 1NTRSPC CRV: CPT | Mod: 82

## 2021-01-19 PROCEDURE — 63048 LAM FACETEC &FORAMOT EA ADDL: CPT | Mod: 82,59

## 2021-01-19 PROCEDURE — 63045 LAM FACETEC & FORAMOT CRV: CPT | Mod: 59

## 2021-01-19 PROCEDURE — 63048 LAM FACETEC &FORAMOT EA ADDL: CPT | Mod: 59

## 2021-01-19 PROCEDURE — 63265 EXCISE INTRASPINL LESION CRV: CPT | Mod: 82

## 2021-01-19 PROCEDURE — 22842 INSERT SPINE FIXATION DEVICE: CPT | Mod: 82

## 2021-01-19 PROCEDURE — 22614 ARTHRD PST TQ 1NTRSPC EA ADD: CPT | Mod: 82

## 2021-01-19 PROCEDURE — 88311 DECALCIFY TISSUE: CPT | Mod: 26

## 2021-01-19 PROCEDURE — 63265 EXCISE INTRASPINL LESION CRV: CPT

## 2021-01-19 PROCEDURE — 22600 ARTHRD PST TQ 1NTRSPC CRV: CPT

## 2021-01-19 PROCEDURE — 88304 TISSUE EXAM BY PATHOLOGIST: CPT | Mod: 26

## 2021-01-19 PROCEDURE — 22614 ARTHRD PST TQ 1NTRSPC EA ADD: CPT

## 2021-01-19 RX ORDER — HYDROMORPHONE HYDROCHLORIDE 2 MG/ML
0.5 INJECTION INTRAMUSCULAR; INTRAVENOUS; SUBCUTANEOUS
Refills: 0 | Status: DISCONTINUED | OUTPATIENT
Start: 2021-01-19 | End: 2021-01-19

## 2021-01-19 RX ORDER — HYDROMORPHONE HYDROCHLORIDE 2 MG/ML
30 INJECTION INTRAMUSCULAR; INTRAVENOUS; SUBCUTANEOUS
Refills: 0 | Status: DISCONTINUED | OUTPATIENT
Start: 2021-01-19 | End: 2021-01-20

## 2021-01-19 RX ORDER — NALOXONE HYDROCHLORIDE 4 MG/.1ML
0.1 SPRAY NASAL
Refills: 0 | Status: DISCONTINUED | OUTPATIENT
Start: 2021-01-19 | End: 2021-01-20

## 2021-01-19 RX ORDER — ONDANSETRON 8 MG/1
4 TABLET, FILM COATED ORAL EVERY 6 HOURS
Refills: 0 | Status: DISCONTINUED | OUTPATIENT
Start: 2021-01-19 | End: 2021-01-20

## 2021-01-19 RX ORDER — ERGOCALCIFEROL 1.25 MG/1
1 CAPSULE ORAL
Qty: 0 | Refills: 0 | DISCHARGE

## 2021-01-19 RX ORDER — SODIUM CHLORIDE 9 MG/ML
500 INJECTION, SOLUTION INTRAVENOUS ONCE
Refills: 0 | Status: COMPLETED | OUTPATIENT
Start: 2021-01-20 | End: 2021-01-20

## 2021-01-19 RX ORDER — METOPROLOL TARTRATE 50 MG
100 TABLET ORAL DAILY
Refills: 0 | Status: DISCONTINUED | OUTPATIENT
Start: 2021-01-19 | End: 2021-01-22

## 2021-01-19 RX ORDER — HYDROCHLOROTHIAZIDE 25 MG
25 TABLET ORAL DAILY
Refills: 0 | Status: DISCONTINUED | OUTPATIENT
Start: 2021-01-19 | End: 2021-01-20

## 2021-01-19 RX ORDER — OXYCODONE HYDROCHLORIDE 5 MG/1
5 TABLET ORAL ONCE
Refills: 0 | Status: DISCONTINUED | OUTPATIENT
Start: 2021-01-19 | End: 2021-01-19

## 2021-01-19 RX ORDER — PANTOPRAZOLE SODIUM 20 MG/1
40 TABLET, DELAYED RELEASE ORAL
Refills: 0 | Status: DISCONTINUED | OUTPATIENT
Start: 2021-01-19 | End: 2021-01-22

## 2021-01-19 RX ORDER — LISINOPRIL 2.5 MG/1
10 TABLET ORAL DAILY
Refills: 0 | Status: DISCONTINUED | OUTPATIENT
Start: 2021-01-19 | End: 2021-01-22

## 2021-01-19 RX ORDER — CYCLOBENZAPRINE HYDROCHLORIDE 10 MG/1
5 TABLET, FILM COATED ORAL EVERY 8 HOURS
Refills: 0 | Status: DISCONTINUED | OUTPATIENT
Start: 2021-01-19 | End: 2021-01-20

## 2021-01-19 RX ORDER — HYDROMORPHONE HYDROCHLORIDE 2 MG/ML
1 INJECTION INTRAMUSCULAR; INTRAVENOUS; SUBCUTANEOUS ONCE
Refills: 0 | Status: DISCONTINUED | OUTPATIENT
Start: 2021-01-19 | End: 2021-01-19

## 2021-01-19 RX ORDER — ACETAMINOPHEN 500 MG
975 TABLET ORAL EVERY 8 HOURS
Refills: 0 | Status: DISCONTINUED | OUTPATIENT
Start: 2021-01-19 | End: 2021-01-22

## 2021-01-19 RX ORDER — SODIUM CHLORIDE 9 MG/ML
1000 INJECTION INTRAMUSCULAR; INTRAVENOUS; SUBCUTANEOUS
Refills: 0 | Status: DISCONTINUED | OUTPATIENT
Start: 2021-01-19 | End: 2021-01-20

## 2021-01-19 RX ORDER — ONDANSETRON 8 MG/1
4 TABLET, FILM COATED ORAL EVERY 4 HOURS
Refills: 0 | Status: DISCONTINUED | OUTPATIENT
Start: 2021-01-19 | End: 2021-01-22

## 2021-01-19 RX ORDER — FENTANYL CITRATE 50 UG/ML
0.5 INJECTION INTRAVENOUS
Qty: 0 | Refills: 0 | DISCHARGE

## 2021-01-19 RX ORDER — SENNA PLUS 8.6 MG/1
2 TABLET ORAL AT BEDTIME
Refills: 0 | Status: DISCONTINUED | OUTPATIENT
Start: 2021-01-19 | End: 2021-01-22

## 2021-01-19 RX ORDER — GABAPENTIN 400 MG/1
100 CAPSULE ORAL THREE TIMES A DAY
Refills: 0 | Status: DISCONTINUED | OUTPATIENT
Start: 2021-01-19 | End: 2021-01-22

## 2021-01-19 RX ORDER — SODIUM CHLORIDE 9 MG/ML
500 INJECTION, SOLUTION INTRAVENOUS ONCE
Refills: 0 | Status: COMPLETED | OUTPATIENT
Start: 2021-01-19 | End: 2021-01-19

## 2021-01-19 RX ORDER — ONDANSETRON 8 MG/1
4 TABLET, FILM COATED ORAL ONCE
Refills: 0 | Status: DISCONTINUED | OUTPATIENT
Start: 2021-01-19 | End: 2021-01-19

## 2021-01-19 RX ORDER — CEFAZOLIN SODIUM 1 G
2000 VIAL (EA) INJECTION EVERY 8 HOURS
Refills: 0 | Status: COMPLETED | OUTPATIENT
Start: 2021-01-19 | End: 2021-01-20

## 2021-01-19 RX ORDER — POTASSIUM CHLORIDE 20 MEQ
1 PACKET (EA) ORAL
Qty: 0 | Refills: 0 | DISCHARGE

## 2021-01-19 RX ADMIN — HYDROMORPHONE HYDROCHLORIDE 0.5 MILLIGRAM(S): 2 INJECTION INTRAMUSCULAR; INTRAVENOUS; SUBCUTANEOUS at 13:30

## 2021-01-19 RX ADMIN — HYDROMORPHONE HYDROCHLORIDE 0.5 MILLIGRAM(S): 2 INJECTION INTRAMUSCULAR; INTRAVENOUS; SUBCUTANEOUS at 13:45

## 2021-01-19 RX ADMIN — SENNA PLUS 2 TABLET(S): 8.6 TABLET ORAL at 21:31

## 2021-01-19 RX ADMIN — HYDROMORPHONE HYDROCHLORIDE 30 MILLILITER(S): 2 INJECTION INTRAMUSCULAR; INTRAVENOUS; SUBCUTANEOUS at 21:05

## 2021-01-19 RX ADMIN — Medication 975 MILLIGRAM(S): at 18:27

## 2021-01-19 RX ADMIN — GABAPENTIN 100 MILLIGRAM(S): 400 CAPSULE ORAL at 21:32

## 2021-01-19 RX ADMIN — GABAPENTIN 100 MILLIGRAM(S): 400 CAPSULE ORAL at 15:43

## 2021-01-19 RX ADMIN — HYDROMORPHONE HYDROCHLORIDE 30 MILLILITER(S): 2 INJECTION INTRAMUSCULAR; INTRAVENOUS; SUBCUTANEOUS at 14:13

## 2021-01-19 RX ADMIN — SODIUM CHLORIDE 3 MILLILITER(S): 9 INJECTION INTRAMUSCULAR; INTRAVENOUS; SUBCUTANEOUS at 21:32

## 2021-01-19 RX ADMIN — Medication 100 MILLIGRAM(S): at 18:27

## 2021-01-19 RX ADMIN — SODIUM CHLORIDE 500 MILLILITER(S): 9 INJECTION, SOLUTION INTRAVENOUS at 20:31

## 2021-01-19 RX ADMIN — HYDROMORPHONE HYDROCHLORIDE 1 MILLIGRAM(S): 2 INJECTION INTRAMUSCULAR; INTRAVENOUS; SUBCUTANEOUS at 14:00

## 2021-01-19 NOTE — BRIEF OPERATIVE NOTE - NSICDXBRIEFPROCEDURE_GEN_ALL_CORE_FT
PROCEDURES:  Decompression and instrumented fusion of cervical spine at multiple levels by posterior approach 19-Jan-2021 13:39:00  Xiang Gracia

## 2021-01-20 ENCOUNTER — TRANSCRIPTION ENCOUNTER (OUTPATIENT)
Age: 76
End: 2021-01-20

## 2021-01-20 DIAGNOSIS — E87.1 HYPO-OSMOLALITY AND HYPONATREMIA: ICD-10-CM

## 2021-01-20 DIAGNOSIS — Z29.9 ENCOUNTER FOR PROPHYLACTIC MEASURES, UNSPECIFIED: ICD-10-CM

## 2021-01-20 DIAGNOSIS — D62 ACUTE POSTHEMORRHAGIC ANEMIA: ICD-10-CM

## 2021-01-20 DIAGNOSIS — Z98.1 ARTHRODESIS STATUS: ICD-10-CM

## 2021-01-20 LAB
ANION GAP SERPL CALC-SCNC: 13 MMOL/L — SIGNIFICANT CHANGE UP (ref 7–14)
BASOPHILS # BLD AUTO: 0.02 K/UL — SIGNIFICANT CHANGE UP (ref 0–0.2)
BASOPHILS NFR BLD AUTO: 0.3 % — SIGNIFICANT CHANGE UP (ref 0–2)
BUN SERPL-MCNC: 20 MG/DL — SIGNIFICANT CHANGE UP (ref 7–23)
CALCIUM SERPL-MCNC: 9.4 MG/DL — SIGNIFICANT CHANGE UP (ref 8.4–10.5)
CHLORIDE SERPL-SCNC: 97 MMOL/L — LOW (ref 98–107)
CO2 SERPL-SCNC: 23 MMOL/L — SIGNIFICANT CHANGE UP (ref 22–31)
CREAT SERPL-MCNC: 1.29 MG/DL — SIGNIFICANT CHANGE UP (ref 0.5–1.3)
EOSINOPHIL # BLD AUTO: 0.04 K/UL — SIGNIFICANT CHANGE UP (ref 0–0.5)
EOSINOPHIL NFR BLD AUTO: 0.7 % — SIGNIFICANT CHANGE UP (ref 0–6)
GLUCOSE SERPL-MCNC: 128 MG/DL — HIGH (ref 70–99)
HCT VFR BLD CALC: 25.3 % — LOW (ref 34.5–45)
HGB BLD-MCNC: 8.1 G/DL — LOW (ref 11.5–15.5)
IANC: 4.55 K/UL — SIGNIFICANT CHANGE UP (ref 1.5–8.5)
IMM GRANULOCYTES NFR BLD AUTO: 0.8 % — SIGNIFICANT CHANGE UP (ref 0–1.5)
LYMPHOCYTES # BLD AUTO: 0.55 K/UL — LOW (ref 1–3.3)
LYMPHOCYTES # BLD AUTO: 9 % — LOW (ref 13–44)
MCHC RBC-ENTMCNC: 32 GM/DL — SIGNIFICANT CHANGE UP (ref 32–36)
MCHC RBC-ENTMCNC: 32.7 PG — SIGNIFICANT CHANGE UP (ref 27–34)
MCV RBC AUTO: 102 FL — HIGH (ref 80–100)
MONOCYTES # BLD AUTO: 0.91 K/UL — HIGH (ref 0–0.9)
MONOCYTES NFR BLD AUTO: 14.9 % — HIGH (ref 2–14)
NEUTROPHILS # BLD AUTO: 4.55 K/UL — SIGNIFICANT CHANGE UP (ref 1.8–7.4)
NEUTROPHILS NFR BLD AUTO: 74.3 % — SIGNIFICANT CHANGE UP (ref 43–77)
NRBC # BLD: 0 /100 WBCS — SIGNIFICANT CHANGE UP
NRBC # FLD: 0 K/UL — SIGNIFICANT CHANGE UP
PLATELET # BLD AUTO: 140 K/UL — LOW (ref 150–400)
POTASSIUM SERPL-MCNC: 4.3 MMOL/L — SIGNIFICANT CHANGE UP (ref 3.5–5.3)
POTASSIUM SERPL-SCNC: 4.3 MMOL/L — SIGNIFICANT CHANGE UP (ref 3.5–5.3)
RBC # BLD: 2.48 M/UL — LOW (ref 3.8–5.2)
RBC # FLD: 14.2 % — SIGNIFICANT CHANGE UP (ref 10.3–14.5)
SODIUM SERPL-SCNC: 133 MMOL/L — LOW (ref 135–145)
WBC # BLD: 6.12 K/UL — SIGNIFICANT CHANGE UP (ref 3.8–10.5)
WBC # FLD AUTO: 6.12 K/UL — SIGNIFICANT CHANGE UP (ref 3.8–10.5)

## 2021-01-20 PROCEDURE — 99223 1ST HOSP IP/OBS HIGH 75: CPT

## 2021-01-20 RX ORDER — OXYCODONE HYDROCHLORIDE 5 MG/1
10 TABLET ORAL
Refills: 0 | Status: DISCONTINUED | OUTPATIENT
Start: 2021-01-20 | End: 2021-01-20

## 2021-01-20 RX ORDER — SODIUM CHLORIDE 9 MG/ML
1000 INJECTION INTRAMUSCULAR; INTRAVENOUS; SUBCUTANEOUS
Refills: 0 | Status: DISCONTINUED | OUTPATIENT
Start: 2021-01-20 | End: 2021-01-22

## 2021-01-20 RX ORDER — CYCLOBENZAPRINE HYDROCHLORIDE 10 MG/1
5 TABLET, FILM COATED ORAL EVERY 8 HOURS
Refills: 0 | Status: DISCONTINUED | OUTPATIENT
Start: 2021-01-20 | End: 2021-01-22

## 2021-01-20 RX ORDER — FENTANYL CITRATE 50 UG/ML
1 INJECTION INTRAVENOUS
Refills: 0 | Status: DISCONTINUED | OUTPATIENT
Start: 2021-01-20 | End: 2021-01-20

## 2021-01-20 RX ORDER — OXYCODONE HYDROCHLORIDE 5 MG/1
15 TABLET ORAL
Refills: 0 | Status: DISCONTINUED | OUTPATIENT
Start: 2021-01-20 | End: 2021-01-21

## 2021-01-20 RX ORDER — OXYCODONE HYDROCHLORIDE 5 MG/1
15 TABLET ORAL
Refills: 0 | Status: DISCONTINUED | OUTPATIENT
Start: 2021-01-20 | End: 2021-01-20

## 2021-01-20 RX ORDER — OXYCODONE HYDROCHLORIDE 5 MG/1
5 TABLET ORAL
Refills: 0 | Status: DISCONTINUED | OUTPATIENT
Start: 2021-01-20 | End: 2021-01-21

## 2021-01-20 RX ADMIN — Medication 100 MILLIGRAM(S): at 04:46

## 2021-01-20 RX ADMIN — CYCLOBENZAPRINE HYDROCHLORIDE 5 MILLIGRAM(S): 10 TABLET, FILM COATED ORAL at 16:02

## 2021-01-20 RX ADMIN — HYDROMORPHONE HYDROCHLORIDE 30 MILLILITER(S): 2 INJECTION INTRAMUSCULAR; INTRAVENOUS; SUBCUTANEOUS at 08:07

## 2021-01-20 RX ADMIN — OXYCODONE HYDROCHLORIDE 10 MILLIGRAM(S): 5 TABLET ORAL at 10:49

## 2021-01-20 RX ADMIN — Medication 975 MILLIGRAM(S): at 10:49

## 2021-01-20 RX ADMIN — SODIUM CHLORIDE 500 MILLILITER(S): 9 INJECTION, SOLUTION INTRAVENOUS at 04:46

## 2021-01-20 RX ADMIN — Medication 100 MILLIGRAM(S): at 01:39

## 2021-01-20 RX ADMIN — SODIUM CHLORIDE 3 MILLILITER(S): 9 INJECTION INTRAMUSCULAR; INTRAVENOUS; SUBCUTANEOUS at 13:36

## 2021-01-20 RX ADMIN — Medication 975 MILLIGRAM(S): at 17:15

## 2021-01-20 RX ADMIN — SODIUM CHLORIDE 3 MILLILITER(S): 9 INJECTION INTRAMUSCULAR; INTRAVENOUS; SUBCUTANEOUS at 21:17

## 2021-01-20 RX ADMIN — Medication 25 MILLIGRAM(S): at 04:45

## 2021-01-20 RX ADMIN — OXYCODONE HYDROCHLORIDE 15 MILLIGRAM(S): 5 TABLET ORAL at 17:15

## 2021-01-20 RX ADMIN — GABAPENTIN 100 MILLIGRAM(S): 400 CAPSULE ORAL at 04:45

## 2021-01-20 RX ADMIN — SENNA PLUS 2 TABLET(S): 8.6 TABLET ORAL at 21:22

## 2021-01-20 RX ADMIN — PANTOPRAZOLE SODIUM 40 MILLIGRAM(S): 20 TABLET, DELAYED RELEASE ORAL at 04:45

## 2021-01-20 RX ADMIN — OXYCODONE HYDROCHLORIDE 10 MILLIGRAM(S): 5 TABLET ORAL at 14:05

## 2021-01-20 RX ADMIN — CYCLOBENZAPRINE HYDROCHLORIDE 5 MILLIGRAM(S): 10 TABLET, FILM COATED ORAL at 01:39

## 2021-01-20 RX ADMIN — GABAPENTIN 100 MILLIGRAM(S): 400 CAPSULE ORAL at 13:37

## 2021-01-20 RX ADMIN — OXYCODONE HYDROCHLORIDE 15 MILLIGRAM(S): 5 TABLET ORAL at 21:21

## 2021-01-20 RX ADMIN — Medication 975 MILLIGRAM(S): at 01:39

## 2021-01-20 RX ADMIN — SODIUM CHLORIDE 75 MILLILITER(S): 9 INJECTION INTRAMUSCULAR; INTRAVENOUS; SUBCUTANEOUS at 17:14

## 2021-01-20 RX ADMIN — LISINOPRIL 10 MILLIGRAM(S): 2.5 TABLET ORAL at 04:46

## 2021-01-20 RX ADMIN — SODIUM CHLORIDE 3 MILLILITER(S): 9 INJECTION INTRAMUSCULAR; INTRAVENOUS; SUBCUTANEOUS at 04:47

## 2021-01-20 RX ADMIN — GABAPENTIN 100 MILLIGRAM(S): 400 CAPSULE ORAL at 21:22

## 2021-01-20 NOTE — DISCHARGE NOTE NURSING/CASE MANAGEMENT/SOCIAL WORK - NSDPDISTO_GEN_ALL_CORE
stable, posterior cervical dressing in place clean dry and intact, tolerating diet, voiding well, oob with assist/Home with home care stable, posterior cervical dressing in place clean dry and intact, tolerating diet, voiding well, oob with assist/Home

## 2021-01-20 NOTE — DISCHARGE NOTE NURSING/CASE MANAGEMENT/SOCIAL WORK - NSDCPECAREGIVERED_GEN_ALL_CORE
carenotes on managing pain at home, cervical laminectomy, carenotes on d/c medications carenotes on managing pain at home, cervical laminectomy, carenotes on d/c medications/Yes

## 2021-01-20 NOTE — CONSULT NOTE ADULT - SUBJECTIVE AND OBJECTIVE BOX
CHIEF COMPLAINT: Patient is a 75y old  Female who presents with a chief complaint of "I'm having surgery on my neck" (07 Jan 2021 14:14)    HPI: 76 y/o female HTN presents to PST preop for C3-6 laminectomy with fusion on 1/19/21. preop dx of spinal stenosis, cervical region.   pt diagnosed with spine cancer 3 years ago. s/p multiple radiation treatments. pt states a recent MRI revealed worsening spinal stenosis. now for surgery.  pt reports chronic neck pain that radiates down her right arm causing numbness, tingling and weakness (07 Jan 2021 14:14)    Pain Symptoms if applicable:             	                         none	   mild         moderate         severe  	                            0	    1-3	     4-6	         7-10  Pain:  Location:	  Modifying factors:	  Associated symptoms:	    Allergies: No Known Allergies    HOME MEDICATIONS: [x] Reviewed    MEDICATIONS  (STANDING):  acetaminophen   Tablet .. 975 milliGRAM(s) Oral every 8 hours  fentaNYL   Patch  50 MICROgram(s)/Hr 1 Patch Transdermal every 72 hours  gabapentin 100 milliGRAM(s) Oral three times a day  hydrochlorothiazide 25 milliGRAM(s) Oral daily  lactated ringers. 1000 milliLiter(s) (30 mL/Hr) IV Continuous <Continuous>  lisinopril 10 milliGRAM(s) Oral daily  metoprolol succinate  milliGRAM(s) Oral daily  pantoprazole    Tablet 40 milliGRAM(s) Oral before breakfast  senna 2 Tablet(s) Oral at bedtime  sodium chloride 0.9% lock flush 3 milliLiter(s) IV Push every 8 hours  sodium chloride 0.9%. 1000 milliLiter(s) (50 mL/Hr) IV Continuous <Continuous>    MEDICATIONS  (PRN):  cyclobenzaprine 5 milliGRAM(s) Oral every 8 hours PRN Muscle Spasm  ondansetron Injectable 4 milliGRAM(s) IV Push every 4 hours PRN Nausea and/or Vomiting  oxyCODONE    IR 10 milliGRAM(s) Oral every 3 hours PRN Moderate Pain (4 - 6)  oxyCODONE    IR 15 milliGRAM(s) Oral every 3 hours PRN Severe Pain (7 - 10)    PAST MEDICAL & SURGICAL HISTORY:  Spinal stenosis, cervical region  Cancer of spine  HTN (hypertension)  H/O eye surgery - repair of hole in left retina  History of back surgery  lumbar spine - S/P cataract extraction b/l  H/O carpal tunnel repair left  S/P hysterectomy  S/P hip replacement b/l  [x ] Reviewed     SOCIAL HISTORY:  · Marital Status	  · Occupation	retired  · Lives With	spouse    Substance Use History:  · Substance Use	never used  · Substance Use Comment	denies. pt drinks 1 cup of decaf tea daily    Alcohol Use History:  · Have you ever consumed alcohol	never    Tobacco Usage:  · Tobacco Usage: Former smoker  · Tobacco Type: cigarettes  quit 20 years ago  · Number of Packs per Day: 1  · Number of yrs: 20  · Pack yrs: 20    FAMILY HISTORY:  FH: breast cancer sister  [x] No pertinent family history in first degree relatives     REVIEW OF SYSTEMS:  [x] All other ROS negative  [  ] Unable to obtain due to poor mental status    Vital Signs Last 24 Hrs  T(C): 36.8 (20 Jan 2021 09:31), Max: 37 (19 Jan 2021 20:00)  T(F): 98.2 (20 Jan 2021 09:31), Max: 98.6 (19 Jan 2021 20:00)  HR: 67 (20 Jan 2021 09:31) (65 - 87)  BP: 107/74 (20 Jan 2021 09:31) (87/51 - 134/90)  BP(mean): 70 (19 Jan 2021 20:00) (56 - 89)  RR: 18 (20 Jan 2021 09:31) (10 - 22)  SpO2: 100% (20 Jan 2021 09:31) (93% - 100%)    PHYSICAL EXAM:  GENERAL: NAD, well-groomed, well-developed  HEAD:  Atraumatic, Normocephalic  EYES: EOMI, PERRLA, conjunctiva and sclera clear  ENMT: Moist mucous membranes  NECK: Supple, No JVD  RESPIRATORY: Clear to auscultation bilaterally; No rales, rhonchi, wheezing, or rubs  CARDIOVASCULAR: Regular rate and rhythm; No murmurs, rubs, or gallops  GASTROINTESTINAL: Soft, Nontender, Nondistended; Bowel sounds present  GENITOURINARY: Not examined  EXTREMITIES:  2+ Peripheral Pulses, No clubbing, cyanosis, or edema  NERVOUS SYSTEM:  Alert & Oriented X3; Moving all 4 extremities; No gross sensory deficits  HEME/LYMPH: No lymphadenopathy noted  SKIN: No rashes or lesions; Incisions C/D/I: +HV    LABS:                        8.1    6.12  )-----------( 140      ( 20 Jan 2021 06:29 )             25.3     Hemoglobin: 8.1 g/dL (01-20 @ 06:29)  Hemoglobin: 9.0 g/dL (01-19 @ 14:40)    01-20    133<L>  |  97<L>  |  20  ----------------------------<  128<H>  4.3   |  23  |  1.29    Ca    9.4      20 Jan 2021 06:29    RADIOLOGY & ADDITIONAL STUDIES:    EKG:   Personally Reviewed:  [x] YES                [ ] Consultant(s) Notes Reviewed  [x] Care Discussed with Consultants/Other Providers: Ortho PA - discussed disposition CHIEF COMPLAINT: Patient is a 75y old  Female who presents with a chief complaint of "I'm having surgery on my neck" (07 Jan 2021 14:14)    HPI: 76 y/o female HTN presents to PST preop for C3-6 laminectomy with fusion on 1/19/21. preop dx of spinal stenosis, cervical region.   pt diagnosed with spine cancer 3 years ago. s/p multiple radiation treatments. pt states a recent MRI revealed worsening spinal stenosis. now for surgery. Pt reports chronic neck pain that radiates down her right arm causing numbness, tingling and weakness (07 Jan 2021 14:14)    Patient uses a specific fentanyl patch at home which is requesting to use. Unfortunately, pharmacy cannot verify her medication and we don't carry the specific brand. We are consulting pain management to see what can be done.     Patient tolerated procedure well. Denies any F/C/N/V, CP or SOB.    Allergies: No Known Allergies    HOME MEDICATIONS: [x] Reviewed    MEDICATIONS  (STANDING):  acetaminophen   Tablet .. 975 milliGRAM(s) Oral every 8 hours  fentaNYL   Patch  50 MICROgram(s)/Hr 1 Patch Transdermal every 72 hours  gabapentin 100 milliGRAM(s) Oral three times a day  hydrochlorothiazide 25 milliGRAM(s) Oral daily  lactated ringers. 1000 milliLiter(s) (30 mL/Hr) IV Continuous <Continuous>  lisinopril 10 milliGRAM(s) Oral daily  metoprolol succinate  milliGRAM(s) Oral daily  pantoprazole    Tablet 40 milliGRAM(s) Oral before breakfast  senna 2 Tablet(s) Oral at bedtime  sodium chloride 0.9% lock flush 3 milliLiter(s) IV Push every 8 hours  sodium chloride 0.9%. 1000 milliLiter(s) (50 mL/Hr) IV Continuous <Continuous>    MEDICATIONS  (PRN):  cyclobenzaprine 5 milliGRAM(s) Oral every 8 hours PRN Muscle Spasm  ondansetron Injectable 4 milliGRAM(s) IV Push every 4 hours PRN Nausea and/or Vomiting  oxyCODONE    IR 10 milliGRAM(s) Oral every 3 hours PRN Moderate Pain (4 - 6)  oxyCODONE    IR 15 milliGRAM(s) Oral every 3 hours PRN Severe Pain (7 - 10)    PAST MEDICAL & SURGICAL HISTORY:  Spinal stenosis, cervical region  Cancer of spine  HTN (hypertension)  H/O eye surgery - repair of hole in left retina  History of back surgery  lumbar spine - S/P cataract extraction b/l  H/O carpal tunnel repair left  S/P hysterectomy  S/P hip replacement b/l  [x ] Reviewed     SOCIAL HISTORY:  · Marital Status	  · Occupation	retired  · Lives With	spouse    Substance Use History:  · Substance Use	never used  · Substance Use Comment	denies. pt drinks 1 cup of decaf tea daily    Alcohol Use History:  · Have you ever consumed alcohol	never    Tobacco Usage:  · Tobacco Usage: Former smoker  · Tobacco Type: cigarettes  quit 20 years ago  · Number of Packs per Day: 1  · Number of yrs: 20  · Pack yrs: 20    FAMILY HISTORY:  FH: breast cancer sister  [x] No pertinent family history in first degree relatives     REVIEW OF SYSTEMS:  [x] All other ROS negative  [  ] Unable to obtain due to poor mental status    Vital Signs Last 24 Hrs  T(C): 36.8 (20 Jan 2021 09:31), Max: 37 (19 Jan 2021 20:00)  T(F): 98.2 (20 Jan 2021 09:31), Max: 98.6 (19 Jan 2021 20:00)  HR: 67 (20 Jan 2021 09:31) (65 - 87)  BP: 107/74 (20 Jan 2021 09:31) (87/51 - 134/90)  BP(mean): 70 (19 Jan 2021 20:00) (56 - 89)  RR: 18 (20 Jan 2021 09:31) (10 - 22)  SpO2: 100% (20 Jan 2021 09:31) (93% - 100%)    PHYSICAL EXAM:  GENERAL: NAD, well-groomed, well-developed  HEAD:  Atraumatic, Normocephalic  EYES: EOMI, PERRLA, conjunctiva and sclera clear  ENMT: Moist mucous membranes  NECK: neck collar in place  RESPIRATORY: Clear to auscultation bilaterally; No rales, rhonchi, wheezing, or rubs  CARDIOVASCULAR: Regular rate and rhythm; No murmurs, rubs, or gallops  GASTROINTESTINAL: Soft, Nontender, Nondistended; Bowel sounds present  GENITOURINARY: Not examined  EXTREMITIES:  2+ Peripheral Pulses, No clubbing, cyanosis, or edema  NERVOUS SYSTEM:  Alert & Oriented X3; Moving all 4 extremities; No gross sensory deficits  HEME/LYMPH: No lymphadenopathy noted  SKIN: No rashes or lesions; Incisions C/D/I: +HV    LABS:                        8.1    6.12  )-----------( 140      ( 20 Jan 2021 06:29 )             25.3     Hemoglobin: 8.1 g/dL (01-20 @ 06:29)  Hemoglobin: 9.0 g/dL (01-19 @ 14:40)    01-20    133<L>  |  97<L>  |  20  ----------------------------<  128<H>  4.3   |  23  |  1.29    Ca    9.4      20 Jan 2021 06:29    RADIOLOGY & ADDITIONAL STUDIES:    EKG:   Personally Reviewed:  [x] YES                [ ] Consultant(s) Notes Reviewed  [x] Care Discussed with Consultants/Other Providers: Ortho PA - discussed disposition

## 2021-01-20 NOTE — CONSULT NOTE ADULT - ASSESSMENT
75F h/o HTN spinal stenosis, cervical region, spine cancer 3 years ago s/p multiple radiation treatments p/w worsening spinal stenosis seen on MRI now s/p C3-6 laminectomy with fusion on 1/19/21.

## 2021-01-20 NOTE — OCCUPATIONAL THERAPY INITIAL EVALUATION ADULT - PERTINENT HX OF CURRENT PROBLEM, REHAB EVAL
Pt is a 76 y/o female HTN presents to PST preop for C3-6 laminectomy with fusion on 1/19/21. preop dx of spinal stenosis, cervical region.

## 2021-01-20 NOTE — CONSULT NOTE ADULT - PROBLEM SELECTOR RECOMMENDATION 3
-BP on the softer side  -would hold HCTZ for now and monitor closely  -c/w lisinopril and metoprolol

## 2021-01-20 NOTE — CONSULT NOTE ADULT - PROBLEM SELECTOR RECOMMENDATION 9
-Pain well controlled; continue management and pain control per ortho recs with oxycodone IR and tylenol tid; Patient uses a specific fentanyl patch at home which is requesting to use. Unfortunately, pharmacy cannot verify her medication and we don't carry the specific brand. We are consulting pain management to see what can be done.   -f/u pain management consult  -c/w incentive spirometer use  -c/w PT

## 2021-01-20 NOTE — DISCHARGE NOTE NURSING/CASE MANAGEMENT/SOCIAL WORK - PATIENT PORTAL LINK FT
You can access the FollowMyHealth Patient Portal offered by [facility] by registering at the following website: [email or web address]. By joining [facility]’s FollowMyHealth portal, you will also be able to view your health information using other applications (apps) compatible with our system.

## 2021-01-20 NOTE — PHYSICAL THERAPY INITIAL EVALUATION ADULT - GENERAL OBSERVATIONS, REHAB EVAL
Patient found supine in bed head of bed elevated in NAD, agreeable to evaluation +cervical collar, enoch, IV

## 2021-01-20 NOTE — CONSULT NOTE ADULT - NSPROBSELRECBLANK_5_GEN
Received call from [facility] that mother has called twice trying to schedule with Dr. Tara Lees. Call back [phone].   DISPLAY PLAN FREE TEXT

## 2021-01-20 NOTE — DISCHARGE NOTE NURSING/CASE MANAGEMENT/SOCIAL WORK - NSDCPNINST_GEN_ALL_CORE
You have a postop appointment with Dr Cuevas on February 3rd, 2021 @ 1:15 please keep dressing in place until this time.  Notify Dr Cuevas if you experience any increase in pain not relieved with medication, any redness, drainage or swelling around incision or any fever >100.5.  Drink plenty of fluids. No heavy lifting or straining.  Use over the counter stool softeners to assist with constipation which can be a side effect of narcotic pain medication.

## 2021-01-20 NOTE — DISCHARGE NOTE NURSING/CASE MANAGEMENT/SOCIAL WORK - NSDCFUADDAPPT_GEN_ALL_CORE_FT
You have a postop appointment with Dr Cuevas on February 3rd, 2021 @ 1:15  Follow-up in the office as instructed for post-op check as well as with PMD for continuity of care.

## 2021-01-21 LAB
ANION GAP SERPL CALC-SCNC: 10 MMOL/L — SIGNIFICANT CHANGE UP (ref 7–14)
BASOPHILS # BLD AUTO: 0.19 K/UL — SIGNIFICANT CHANGE UP (ref 0–0.2)
BASOPHILS NFR BLD AUTO: 3.5 % — HIGH (ref 0–2)
BUN SERPL-MCNC: 21 MG/DL — SIGNIFICANT CHANGE UP (ref 7–23)
CALCIUM SERPL-MCNC: 9.3 MG/DL — SIGNIFICANT CHANGE UP (ref 8.4–10.5)
CHLORIDE SERPL-SCNC: 101 MMOL/L — SIGNIFICANT CHANGE UP (ref 98–107)
CO2 SERPL-SCNC: 26 MMOL/L — SIGNIFICANT CHANGE UP (ref 22–31)
CREAT SERPL-MCNC: 1.19 MG/DL — SIGNIFICANT CHANGE UP (ref 0.5–1.3)
EOSINOPHIL # BLD AUTO: 0.05 K/UL — SIGNIFICANT CHANGE UP (ref 0–0.5)
EOSINOPHIL NFR BLD AUTO: 0.9 % — SIGNIFICANT CHANGE UP (ref 0–6)
GLUCOSE SERPL-MCNC: 131 MG/DL — HIGH (ref 70–99)
HCT VFR BLD CALC: 25.4 % — LOW (ref 34.5–45)
HGB BLD-MCNC: 8.2 G/DL — LOW (ref 11.5–15.5)
IANC: 3.75 K/UL — SIGNIFICANT CHANGE UP (ref 1.5–8.5)
LYMPHOCYTES # BLD AUTO: 0.24 K/UL — LOW (ref 1–3.3)
LYMPHOCYTES # BLD AUTO: 4.4 % — LOW (ref 13–44)
MCHC RBC-ENTMCNC: 32.3 GM/DL — SIGNIFICANT CHANGE UP (ref 32–36)
MCHC RBC-ENTMCNC: 33.6 PG — SIGNIFICANT CHANGE UP (ref 27–34)
MCV RBC AUTO: 104.1 FL — HIGH (ref 80–100)
MONOCYTES # BLD AUTO: 0.48 K/UL — SIGNIFICANT CHANGE UP (ref 0–0.9)
MONOCYTES NFR BLD AUTO: 8.8 % — SIGNIFICANT CHANGE UP (ref 2–14)
NEUTROPHILS # BLD AUTO: 4.39 K/UL — SIGNIFICANT CHANGE UP (ref 1.8–7.4)
NEUTROPHILS NFR BLD AUTO: 79.8 % — HIGH (ref 43–77)
PLATELET # BLD AUTO: 135 K/UL — LOW (ref 150–400)
POTASSIUM SERPL-MCNC: 4.1 MMOL/L — SIGNIFICANT CHANGE UP (ref 3.5–5.3)
POTASSIUM SERPL-SCNC: 4.1 MMOL/L — SIGNIFICANT CHANGE UP (ref 3.5–5.3)
RBC # BLD: 2.44 M/UL — LOW (ref 3.8–5.2)
RBC # FLD: 14.2 % — SIGNIFICANT CHANGE UP (ref 10.3–14.5)
SODIUM SERPL-SCNC: 137 MMOL/L — SIGNIFICANT CHANGE UP (ref 135–145)
SURGICAL PATHOLOGY STUDY: SIGNIFICANT CHANGE UP
WBC # BLD: 5.44 K/UL — SIGNIFICANT CHANGE UP (ref 3.8–10.5)
WBC # FLD AUTO: 5.44 K/UL — SIGNIFICANT CHANGE UP (ref 3.8–10.5)

## 2021-01-21 PROCEDURE — 99233 SBSQ HOSP IP/OBS HIGH 50: CPT

## 2021-01-21 RX ORDER — SODIUM CHLORIDE 9 MG/ML
1000 INJECTION INTRAMUSCULAR; INTRAVENOUS; SUBCUTANEOUS
Refills: 0 | Status: DISCONTINUED | OUTPATIENT
Start: 2021-01-21 | End: 2021-01-22

## 2021-01-21 RX ORDER — OXYCODONE HYDROCHLORIDE 5 MG/1
10 TABLET ORAL
Refills: 0 | Status: DISCONTINUED | OUTPATIENT
Start: 2021-01-21 | End: 2021-01-22

## 2021-01-21 RX ORDER — OXYCODONE HYDROCHLORIDE 5 MG/1
2.5 TABLET ORAL
Refills: 0 | Status: DISCONTINUED | OUTPATIENT
Start: 2021-01-21 | End: 2021-01-22

## 2021-01-21 RX ADMIN — OXYCODONE HYDROCHLORIDE 10 MILLIGRAM(S): 5 TABLET ORAL at 12:14

## 2021-01-21 RX ADMIN — PANTOPRAZOLE SODIUM 40 MILLIGRAM(S): 20 TABLET, DELAYED RELEASE ORAL at 05:47

## 2021-01-21 RX ADMIN — OXYCODONE HYDROCHLORIDE 15 MILLIGRAM(S): 5 TABLET ORAL at 01:27

## 2021-01-21 RX ADMIN — GABAPENTIN 100 MILLIGRAM(S): 400 CAPSULE ORAL at 21:57

## 2021-01-21 RX ADMIN — CYCLOBENZAPRINE HYDROCHLORIDE 5 MILLIGRAM(S): 10 TABLET, FILM COATED ORAL at 05:47

## 2021-01-21 RX ADMIN — Medication 975 MILLIGRAM(S): at 10:59

## 2021-01-21 RX ADMIN — SODIUM CHLORIDE 75 MILLILITER(S): 9 INJECTION INTRAMUSCULAR; INTRAVENOUS; SUBCUTANEOUS at 11:00

## 2021-01-21 RX ADMIN — OXYCODONE HYDROCHLORIDE 10 MILLIGRAM(S): 5 TABLET ORAL at 23:38

## 2021-01-21 RX ADMIN — SODIUM CHLORIDE 3 MILLILITER(S): 9 INJECTION INTRAMUSCULAR; INTRAVENOUS; SUBCUTANEOUS at 13:13

## 2021-01-21 RX ADMIN — Medication 975 MILLIGRAM(S): at 17:36

## 2021-01-21 RX ADMIN — SODIUM CHLORIDE 3 MILLILITER(S): 9 INJECTION INTRAMUSCULAR; INTRAVENOUS; SUBCUTANEOUS at 05:19

## 2021-01-21 RX ADMIN — GABAPENTIN 100 MILLIGRAM(S): 400 CAPSULE ORAL at 05:47

## 2021-01-21 RX ADMIN — Medication 975 MILLIGRAM(S): at 23:38

## 2021-01-21 RX ADMIN — SODIUM CHLORIDE 3 MILLILITER(S): 9 INJECTION INTRAMUSCULAR; INTRAVENOUS; SUBCUTANEOUS at 21:53

## 2021-01-21 RX ADMIN — OXYCODONE HYDROCHLORIDE 15 MILLIGRAM(S): 5 TABLET ORAL at 07:37

## 2021-01-21 RX ADMIN — CYCLOBENZAPRINE HYDROCHLORIDE 5 MILLIGRAM(S): 10 TABLET, FILM COATED ORAL at 21:57

## 2021-01-21 RX ADMIN — GABAPENTIN 100 MILLIGRAM(S): 400 CAPSULE ORAL at 13:13

## 2021-01-21 RX ADMIN — OXYCODONE HYDROCHLORIDE 15 MILLIGRAM(S): 5 TABLET ORAL at 04:09

## 2021-01-21 RX ADMIN — Medication 975 MILLIGRAM(S): at 01:27

## 2021-01-21 RX ADMIN — CYCLOBENZAPRINE HYDROCHLORIDE 5 MILLIGRAM(S): 10 TABLET, FILM COATED ORAL at 13:13

## 2021-01-21 RX ADMIN — OXYCODONE HYDROCHLORIDE 10 MILLIGRAM(S): 5 TABLET ORAL at 17:36

## 2021-01-22 ENCOUNTER — TRANSCRIPTION ENCOUNTER (OUTPATIENT)
Age: 76
End: 2021-01-22

## 2021-01-22 VITALS
OXYGEN SATURATION: 100 % | RESPIRATION RATE: 17 BRPM | DIASTOLIC BLOOD PRESSURE: 48 MMHG | TEMPERATURE: 98 F | SYSTOLIC BLOOD PRESSURE: 105 MMHG | HEART RATE: 72 BPM

## 2021-01-22 PROCEDURE — 99233 SBSQ HOSP IP/OBS HIGH 50: CPT

## 2021-01-22 RX ORDER — CYCLOBENZAPRINE HYDROCHLORIDE 10 MG/1
1 TABLET, FILM COATED ORAL
Qty: 45 | Refills: 0
Start: 2021-01-22 | End: 2021-02-05

## 2021-01-22 RX ORDER — PANTOPRAZOLE SODIUM 20 MG/1
1 TABLET, DELAYED RELEASE ORAL
Qty: 30 | Refills: 0
Start: 2021-01-22 | End: 2021-02-20

## 2021-01-22 RX ORDER — ACETAMINOPHEN 500 MG
3 TABLET ORAL
Qty: 0 | Refills: 0 | DISCHARGE
Start: 2021-01-22

## 2021-01-22 RX ORDER — LISINOPRIL 2.5 MG/1
1 TABLET ORAL
Qty: 0 | Refills: 0 | DISCHARGE

## 2021-01-22 RX ORDER — PALBOCICLIB 100 MG/1
1 CAPSULE ORAL
Qty: 0 | Refills: 0 | DISCHARGE

## 2021-01-22 RX ORDER — OXYCODONE HYDROCHLORIDE 5 MG/1
2 TABLET ORAL
Qty: 56 | Refills: 0
Start: 2021-01-22 | End: 2021-01-28

## 2021-01-22 RX ORDER — GABAPENTIN 400 MG/1
1 CAPSULE ORAL
Qty: 45 | Refills: 0
Start: 2021-01-22 | End: 2021-02-05

## 2021-01-22 RX ORDER — METOPROLOL TARTRATE 50 MG
1 TABLET ORAL
Qty: 0 | Refills: 0 | DISCHARGE

## 2021-01-22 RX ORDER — METOPROLOL TARTRATE 50 MG
1 TABLET ORAL
Qty: 0 | Refills: 0 | DISCHARGE
Start: 2021-01-22

## 2021-01-22 RX ORDER — SENNA PLUS 8.6 MG/1
2 TABLET ORAL
Qty: 30 | Refills: 0
Start: 2021-01-22 | End: 2021-02-05

## 2021-01-22 RX ADMIN — LISINOPRIL 10 MILLIGRAM(S): 2.5 TABLET ORAL at 05:49

## 2021-01-22 RX ADMIN — OXYCODONE HYDROCHLORIDE 10 MILLIGRAM(S): 5 TABLET ORAL at 05:49

## 2021-01-22 RX ADMIN — SODIUM CHLORIDE 3 MILLILITER(S): 9 INJECTION INTRAMUSCULAR; INTRAVENOUS; SUBCUTANEOUS at 05:32

## 2021-01-22 RX ADMIN — CYCLOBENZAPRINE HYDROCHLORIDE 5 MILLIGRAM(S): 10 TABLET, FILM COATED ORAL at 13:37

## 2021-01-22 RX ADMIN — OXYCODONE HYDROCHLORIDE 10 MILLIGRAM(S): 5 TABLET ORAL at 13:01

## 2021-01-22 RX ADMIN — GABAPENTIN 100 MILLIGRAM(S): 400 CAPSULE ORAL at 05:49

## 2021-01-22 RX ADMIN — Medication 975 MILLIGRAM(S): at 09:44

## 2021-01-22 RX ADMIN — CYCLOBENZAPRINE HYDROCHLORIDE 5 MILLIGRAM(S): 10 TABLET, FILM COATED ORAL at 05:49

## 2021-01-22 RX ADMIN — PANTOPRAZOLE SODIUM 40 MILLIGRAM(S): 20 TABLET, DELAYED RELEASE ORAL at 05:49

## 2021-01-22 RX ADMIN — GABAPENTIN 100 MILLIGRAM(S): 400 CAPSULE ORAL at 13:37

## 2021-01-22 NOTE — PROGRESS NOTE ADULT - ASSESSMENT
75F h/o HTN spinal stenosis, cervical region, spine cancer 3 years ago s/p multiple radiation treatments p/w worsening spinal stenosis seen on MRI now s/p C3-6 laminectomy with fusion on 1/19/21. 
75F h/o HTN spinal stenosis, cervical region, spine cancer 3 years ago s/p multiple radiation treatments p/w worsening spinal stenosis seen on MRI now s/p C3-6 laminectomy with fusion on 1/19/21.

## 2021-01-22 NOTE — DISCHARGE NOTE PROVIDER - CARE PROVIDER_API CALL
Jordi Cuevas)  Orthopaedic Surgery  [address], [address]  [place] [address]  Phone: [phone]  Fax: [phone]  Follow Up Time: 2 weeks

## 2021-01-22 NOTE — PROGRESS NOTE ADULT - PROBLEM SELECTOR PLAN 4
-likely secondary to post-op changes; pt hemodynamically stable; will monitor.
-likely secondary to post-op changes; pt hemodynamically stable; will monitor.

## 2021-01-22 NOTE — PROGRESS NOTE ADULT - SUBJECTIVE AND OBJECTIVE BOX
Patient seen and examined. No acute events overnight. Pain well controlled. Denies CP/SOB/F/C.  She has ambulated well with PT.      Vitals 24hrs  Vital Signs Last 24 Hrs  T(C): 37 (22 Jan 2021 05:36), Max: 37.2 (21 Jan 2021 17:28)  T(F): 98.6 (22 Jan 2021 05:36), Max: 98.9 (21 Jan 2021 17:28)  HR: 75 (22 Jan 2021 05:36) (70 - 77)  BP: 121/55 (22 Jan 2021 05:36) (93/52 - 121/55)  BP(mean): --  ABP: --  ABP(mean): --  RR: 17 (22 Jan 2021 05:36) (17 - 18)  SpO2: 97% (22 Jan 2021 05:36) (93% - 100%)          Lab Results 24hrs:                                   8.2    5.44  )-----------( 135      ( 21 Jan 2021 07:41 )             25.4             Spine PE:  Dressing clean dry intact    Gen: NAD    Spine PE:  Positive Ramesh's  Negative Babinski      Motor:                   C5                C6              C7               C8           T1   R            4/5                4/5            4/5             4/5          4/5  L             5/5               5/5             5/5             5/5          5/5                L2             L3             L4               L5            S1  R         5/5           5/5          5/5             5/5           5/5  L          5/5          5/5           5/5             5/5           5/5    Sensory:            C5         C6         C7      C8       T1        (0=absent, 1=impaired, 2=normal, NT=not testable)  R         2            2           2        2         2  L          2            2           2        2         2               L2          L3         L4      L5       S1         (0=absent, 1=impaired, 2=normal, NT=not testable)  R         2            2            2        2        2  L          2            2           2        2         2      Assessment/Plan:  This is a 75yFemale admitted for C3-6 lami/PSF.    -HV dc'd this AM  -PT/OT/OOB  -WBAT  -DVT ppx: Venodunes  -dispo: Home when drains are discontinued  
  Patient seen and examined.  No acute events overnight.  Pain well controlled.     Vital Signs Last 24 Hrs  T(C): 36.5 (20 Jan 2021 04:43), Max: 37 (19 Jan 2021 20:00)  T(F): 97.7 (20 Jan 2021 04:43), Max: 98.6 (19 Jan 2021 20:00)  HR: 65 (20 Jan 2021 04:43) (65 - 87)  BP: 101/65 (20 Jan 2021 04:43) (87/51 - 140/60)  BP(mean): 70 (19 Jan 2021 20:00) (56 - 89)  RR: 18 (20 Jan 2021 04:43) (10 - 22)  SpO2: 100% (20 Jan 2021 04:43) (93% - 100%)    01-19 @ 07:01  -  01-20 @ 06:19  --------------------------------------------------------  IN: 350 mL / OUT: 950 mL / NET: -600 mL                            9.0    3.96  )-----------( 132      ( 19 Jan 2021 14:40 )             26.8   01-19    138  |  102  |  17  ----------------------------<  186<H>  3.7   |  23  |  1.17    Ca    9.5      19 Jan 2021 14:40        Spine PE:  Dressing clean dry intact    Gen: NAD    Spine PE:  Positive Ramesh's  Negative Babinski      Motor:                   C5                C6              C7               C8           T1   R            5/5                5/5            5/5             5/5          5/5  L             5/5               5/5             5/5             5/5          5/5                L2             L3             L4               L5            S1  R         5/5           5/5          5/5             5/5           5/5  L          5/5          5/5           5/5             5/5           5/5    Sensory:            C5         C6         C7      C8       T1        (0=absent, 1=impaired, 2=normal, NT=not testable)  R         2            2           2        2         2  L          2            2           2        2         2               L2          L3         L4      L5       S1         (0=absent, 1=impaired, 2=normal, NT=not testable)  R         2            2            2        2        2  L          2            2           2        2         2      Assessment/Plan:  This is a 75yFemale admitted for C3-6 lami/PSF.    -Monitor HV o/p  -Gregorio until ambulation  -pain control: PCA  -PT  -WBAT  -OOB  -DVT ppx: Venodunes  -dispo plan  
  Patient seen and examined. No acute events overnight. Pain well controlled. Denies CP/SOB/F/C.  She has ambulated well with PT.      Vitals 24hrs  Vital Signs Last 24 Hrs  T(C): 36.6 (21 Jan 2021 05:45), Max: 36.9 (20 Jan 2021 21:16)  T(F): 97.8 (21 Jan 2021 05:45), Max: 98.4 (20 Jan 2021 21:16)  HR: 70 (21 Jan 2021 05:45) (65 - 70)  BP: 96/62 (21 Jan 2021 05:45) (94/56 - 107/74)  BP(mean): --  RR: 18 (21 Jan 2021 05:45) (16 - 18)  SpO2: 99% (21 Jan 2021 05:45) (98% - 100%)      01-19-21 @ 07:01  -  01-20-21 @ 07:00  --------------------------------------------------------  IN: 350 mL / OUT: 1370 mL / NET: -1020 mL    01-20-21 @ 07:01  -  01-21-21 @ 06:43  --------------------------------------------------------  IN: 0 mL / OUT: 1952.5 mL / NET: -1952.5 mL        Lab Results 24hrs:                        8.1    6.12  )-----------( 140      ( 20 Jan 2021 06:29 )             25.3     01-20    133<L>  |  97<L>  |  20  ----------------------------<  128<H>  4.3   |  23  |  1.29    Ca    9.4      20 Jan 2021 06:29          Spine PE:  Dressing clean dry intact    Gen: NAD    Spine PE:  Positive Ramesh's  Negative Babinski      Motor:                   C5                C6              C7               C8           T1   R            4/5                4/5            4/5             4/5          4/5  L             5/5               5/5             5/5             5/5          5/5                L2             L3             L4               L5            S1  R         5/5           5/5          5/5             5/5           5/5  L          5/5          5/5           5/5             5/5           5/5    Sensory:            C5         C6         C7      C8       T1        (0=absent, 1=impaired, 2=normal, NT=not testable)  R         2            2           2        2         2  L          2            2           2        2         2               L2          L3         L4      L5       S1         (0=absent, 1=impaired, 2=normal, NT=not testable)  R         2            2            2        2        2  L          2            2           2        2         2      Assessment/Plan:  This is a 75yFemale admitted for C3-6 lami/PSF.    -Monitor HV ouput  -PT/OT/OOB  -WBAT  -DVT ppx: Venodunes  -dispo: Home when drains are discontinued  
Anesthesia Pain Management Service    SUBJECTIVE: I'm doing ok, I take pain meds at home     Pain Scale Score	At rest: 6___ 	With Activity: ___ 	[X ] Refer to charted pain scores    THERAPY:    [ ] IV PCA Morphine		[ ] 5 mg/mL	[ ] 1 mg/mL  [X ] IV PCA Hydromorphone	[ ] 5 mg/mL	[X ] 1 mg/mL  [ ] IV PCA Fentanyl		[ ] 50 micrograms/mL    Demand dose __0.2_ lockout __6_ (minutes) Continuous Rate _0__ Total: _7__   mg used (in past 24 hrs)      MEDICATIONS  (STANDING):  acetaminophen   Tablet .. 975 milliGRAM(s) Oral every 8 hours  fentaNYL   Patch  50 MICROgram(s)/Hr 1 Patch Transdermal every 72 hours  gabapentin 100 milliGRAM(s) Oral three times a day  hydrochlorothiazide 25 milliGRAM(s) Oral daily  lactated ringers. 1000 milliLiter(s) (30 mL/Hr) IV Continuous <Continuous>  lisinopril 10 milliGRAM(s) Oral daily  metoprolol succinate  milliGRAM(s) Oral daily  pantoprazole    Tablet 40 milliGRAM(s) Oral before breakfast  senna 2 Tablet(s) Oral at bedtime  sodium chloride 0.9% lock flush 3 milliLiter(s) IV Push every 8 hours  sodium chloride 0.9%. 1000 milliLiter(s) (50 mL/Hr) IV Continuous <Continuous>    MEDICATIONS  (PRN):  cyclobenzaprine 5 milliGRAM(s) Oral every 8 hours PRN Muscle Spasm  ondansetron Injectable 4 milliGRAM(s) IV Push every 4 hours PRN Nausea and/or Vomiting  oxyCODONE    IR 10 milliGRAM(s) Oral every 3 hours PRN Moderate Pain (4 - 6)  oxyCODONE    IR 15 milliGRAM(s) Oral every 3 hours PRN Severe Pain (7 - 10)      OBJECTIVE: laying in bed     Sedation Score:	[ X] Alert	[ ] Drowsy 	[ ] Arousable	[ ] Asleep	[ ] Unresponsive    Side Effects:	[X ] None	[ ] Nausea	[ ] Vomiting	[ ] Pruritus  		[ ] Other:    Vital Signs Last 24 Hrs  T(C): 36.8 (20 Jan 2021 09:31), Max: 37 (19 Jan 2021 20:00)  T(F): 98.2 (20 Jan 2021 09:31), Max: 98.6 (19 Jan 2021 20:00)  HR: 67 (20 Jan 2021 09:31) (65 - 87)  BP: 107/74 (20 Jan 2021 09:31) (87/51 - 134/90)  BP(mean): 70 (19 Jan 2021 20:00) (56 - 89)  RR: 18 (20 Jan 2021 09:31) (10 - 22)  SpO2: 100% (20 Jan 2021 09:31) (93% - 100%)    ASSESSMENT/ PLAN    Therapy to  be:	[ ] Continue   [ X] Discontinued   [X ] Change to prn Analgesics    Documentation and Verification of current medications:   [X] Done	[ ] Not done, not elligible    Comments: Patient takes fentanyl 50mcg patch q72hrs as per istop, plan is to resume patch and start PRN Oral/IV opioids and/or Adjuvant medication to be ordered at this point.    Progress Note written now but Patient was seen earlier.
Anesthesia Pain Management Service    SUBJECTIVE: Pt now off IV PCA without problems reported.    Therapy:	  [ X] IV PCA	   [ ] Epidural           [ ] s/p Spinal Opoid              [ ] Postpartum infusion	  [ ] Patient controlled regional anesthesia (PCRA)    [ ] prn Analgesics    Allergies    No Known Allergies    Intolerances      MEDICATIONS  (STANDING):  acetaminophen   Tablet .. 975 milliGRAM(s) Oral every 8 hours  cyclobenzaprine 5 milliGRAM(s) Oral every 8 hours  gabapentin 100 milliGRAM(s) Oral three times a day  lisinopril 10 milliGRAM(s) Oral daily  metoprolol succinate  milliGRAM(s) Oral daily  oxyCODONE    IR 15 milliGRAM(s) Oral every 3 hours  pantoprazole    Tablet 40 milliGRAM(s) Oral before breakfast  senna 2 Tablet(s) Oral at bedtime  sodium chloride 0.9% lock flush 3 milliLiter(s) IV Push every 8 hours  sodium chloride 0.9%. 1000 milliLiter(s) (75 mL/Hr) IV Continuous <Continuous>    MEDICATIONS  (PRN):  ondansetron Injectable 4 milliGRAM(s) IV Push every 4 hours PRN Nausea and/or Vomiting  oxyCODONE    IR 5 milliGRAM(s) Oral every 3 hours PRN breakthrough pain      OBJECTIVE:   [X] No new signs     [ ] Other:    Side Effects:  [X ] None			[ ] Other:    Assessment of Catheter Site:		[ ] Intact		[ ] Other:    ASSESSMENT/PLAN  [ ] Continue current therapy    [X ] Therapy changed to:    [ ] IV PCA       [ ] Epidural     [ X] prn Analgesics     Comments: PRN Oral/IV opioids and/or non-opioid adjuvant analgesics to be used at this point. Oxycodone dose increased to 15mg PO standing with 5mg PO breakthrough dose. Pharmacy does not carry the specific fentanyl patch that the patient is requesting, therefore she will resume it at home.     Progress Note written now but Patient was seen earlier.
POST ANESTHESIA EVALUATION    75y Female POSTOP DAY 1      MENTAL STATUS: Patient participation [  ] Awake     [  ] Arousable     [  ] Sedated    AIRWAY PATENCY: [  ] Satisfactory  [  ] Other:     Vital Signs Last 24 Hrs  T(C): 36.8 (20 Jan 2021 17:32), Max: 37 (19 Jan 2021 20:00)  T(F): 98.3 (20 Jan 2021 17:32), Max: 98.6 (19 Jan 2021 20:00)  HR: 65 (20 Jan 2021 17:32) (65 - 81)  BP: 94/56 (20 Jan 2021 17:32) (94/56 - 127/67)  BP(mean): 70 (19 Jan 2021 20:00) (70 - 89)  RR: 17 (20 Jan 2021 17:32) (17 - 19)  SpO2: 99% (20 Jan 2021 17:32) (94% - 100%)  I&O's Summary    19 Jan 2021 07:01  -  20 Jan 2021 07:00  --------------------------------------------------------  IN: 350 mL / OUT: 1370 mL / NET: -1020 mL    20 Jan 2021 07:01  -  20 Jan 2021 17:40  --------------------------------------------------------  IN: 0 mL / OUT: 950 mL / NET: -950 mL          NAUSEA/ VOMITTING:  [  ] NONE  [  ] CONTROLLED [  ] OTHER     PAIN: [  ] CONTROLLED WITH CURRENT REGIMEN  [  ] OTHER    [  ] NO APPARENT ANESTHESIA COMPLICATIONS      Comments: 
Patient is seen and examined. Denies CP/SOB/Dizziness/N/V/D/HA. Pain is moderate. Patient is using her PCA pump for pain control      Vital Signs Last 24 Hrs  T(C): 36.5 (19 Jan 2021 13:30), Max: 36.9 (19 Jan 2021 07:06)  T(F): 97.7 (19 Jan 2021 13:30), Max: 98.4 (19 Jan 2021 07:06)  HR: 82 (19 Jan 2021 16:00) (68 - 87)  BP: 105/60 (19 Jan 2021 16:00) (87/51 - 140/60)  BP(mean): 57 (19 Jan 2021 16:00) (56 - 88)  RR: 18 (19 Jan 2021 16:00) (10 - 22)  SpO2: 97% (19 Jan 2021 16:00) (96% - 100%)    Gen: NAD    NECK:  Dressing C/D/I. Collar in place. Drain in place  Motor reduced 3/5 RUE throughout. Motor intact 5/5 LUE, Biceps/Tricpes. Sensation is grossly intact in the BL UE. AIN/PIN/R/U/M intact. Extremity is warm to touch.      Labs:                           9.0    3.96  )-----------( 132      ( 19 Jan 2021 14:40 )             26.8     01-19    138  |  102  |  17  ----------------------------<  186<H>  3.7   |  23  |  1.17    Ca    9.5      19 Jan 2021 14:40        A/P: Patient is a 75y y/o Female s/p C3-C6 Lami/PSF    -Pain control/Analgesia  -Inc Spirometry  -Venodynes  -F/U AM Labs  -PT/OT  -WBAT  -Monitor Drain output  -Keep drain in place  -Continue to monitor NV exam  -Notify Ortho with any questions  
Patient takes fentanyl patch at home. States that her patches are specific and she is unable to take different fentanly patches due to adverse reaction. Patients  brought patches from home for verfication with our pharmacy, however due to this being a controlled substance and package was opened, pharmacy states we are unable to verify use while in hospital. Pain service contacted about concern with stopping fentanyl patches as this is patients home med. State that we can increase oxycodone to 15 mg every 3 hours as standing medication rather than PRN. Also recommended to order breakthrough oxycodone 5mg every 3 hours. Pharmacy aware of recommendations. 
Ariela Magdaleno MD  [facility] Division of Hospital Medicine  Pager [phone] (M-F 8AM-5PM)  Other Times: [phone]    Patient is a 75y old  Female who presents with a chief complaint of C3-6 cervical laminectomy/PSF (22 Jan 2021 10:24)    SUBJECTIVE / OVERNIGHT EVENTS: Patient feeling well, no complaints.     MEDICATIONS  (STANDING):  acetaminophen   Tablet .. 975 milliGRAM(s) Oral every 8 hours  cyclobenzaprine 5 milliGRAM(s) Oral every 8 hours  gabapentin 100 milliGRAM(s) Oral three times a day  lisinopril 10 milliGRAM(s) Oral daily  metoprolol succinate  milliGRAM(s) Oral daily  oxyCODONE    IR 10 milliGRAM(s) Oral every 3 hours  pantoprazole    Tablet 40 milliGRAM(s) Oral before breakfast  senna 2 Tablet(s) Oral at bedtime  sodium chloride 0.9% lock flush 3 milliLiter(s) IV Push every 8 hours  sodium chloride 0.9%. 1000 milliLiter(s) (75 mL/Hr) IV Continuous <Continuous>  sodium chloride 0.9%. 1000 milliLiter(s) (75 mL/Hr) IV Continuous <Continuous>    MEDICATIONS  (PRN):  ondansetron Injectable 4 milliGRAM(s) IV Push every 4 hours PRN Nausea and/or Vomiting  oxyCODONE    IR 2.5 milliGRAM(s) Oral every 3 hours PRN severe BTP if pain persists 1 hour after oral meds      PHYSICAL EXAM:  Vital Signs Last 24 Hrs  T(C): 36.9 (22 Jan 2021 13:04), Max: 37.2 (21 Jan 2021 17:28)  T(F): 98.4 (22 Jan 2021 13:04), Max: 98.9 (21 Jan 2021 17:28)  HR: 72 (22 Jan 2021 13:04) (70 - 79)  BP: 105/48 (22 Jan 2021 13:04) (99/64 - 121/55)  BP(mean): --  RR: 17 (22 Jan 2021 13:04) (17 - 17)  SpO2: 100% (22 Jan 2021 13:04) (97% - 100%)    CONSTITUTIONAL: NAD, well-developed, well-groomed  RESPIRATORY: Normal respiratory effort; lungs are clear to auscultation bilaterally  CARDIOVASCULAR: Regular rate and rhythm, normal S1 and S2, no murmur/rub/gallop; No lower extremity edema  GASTROINTESTINAL: Nontender to palpation, normoactive bowel sounds, no rebound/guarding; No hepatosplenomegaly  MUSCULOSKELETAL:  no clubbing or cyanosis of digits; no joint swelling or tenderness to palpation  NEUROLOGY: non-focal; no gross sensory deficits   PSYCH: A+O to person, place, and time; affect appropriate  SKIN: No rashes; warm, surgical site dressing c/d/i     LABS:                        8.2    5.44  )-----------( 135      ( 21 Jan 2021 07:41 )             25.4     01-21    137  |  101  |  21  ----------------------------<  131<H>  4.1   |  26  |  1.19    Ca    9.3      21 Jan 2021 07:41    RADIOLOGY & ADDITIONAL TESTS:  Results Reviewed:   Imaging Personally Reviewed:  Electrocardiogram Personally Reviewed:    COORDINATION OF CARE:  Care Discussed with Consultants/Other Providers [Y/N]:  Prior or Outpatient Records Reviewed [Y/N]:  
CHIEF COMPLAINT: f/u cervical spine surgery    SUBJECTIVE / OVERNIGHT EVENTS: Patient seen and examined. No acute events overnight. Pain somewhat controlled -- patient feels the pain coming and is able to ask for pain meds which helps she says.`    MEDICATIONS  (STANDING):  acetaminophen   Tablet .. 975 milliGRAM(s) Oral every 8 hours  cyclobenzaprine 5 milliGRAM(s) Oral every 8 hours  gabapentin 100 milliGRAM(s) Oral three times a day  lisinopril 10 milliGRAM(s) Oral daily  metoprolol succinate  milliGRAM(s) Oral daily  oxyCODONE    IR 10 milliGRAM(s) Oral every 3 hours  pantoprazole    Tablet 40 milliGRAM(s) Oral before breakfast  senna 2 Tablet(s) Oral at bedtime  sodium chloride 0.9% lock flush 3 milliLiter(s) IV Push every 8 hours  sodium chloride 0.9%. 1000 milliLiter(s) (75 mL/Hr) IV Continuous <Continuous>  sodium chloride 0.9%. 1000 milliLiter(s) (75 mL/Hr) IV Continuous <Continuous>    MEDICATIONS  (PRN):  ondansetron Injectable 4 milliGRAM(s) IV Push every 4 hours PRN Nausea and/or Vomiting  oxyCODONE    IR 2.5 milliGRAM(s) Oral every 3 hours PRN severe BTP if pain persists 1 hour after oral meds    VITALS:  T(F): 98.3 (01-21-21 @ 13:33), Max: 98.4 (01-20-21 @ 21:16)  HR: 77 (01-21-21 @ 13:33) (65 - 77)  BP: 99/53 (01-21-21 @ 13:33) (93/52 - 111/52)  RR: 17 (01-21-21 @ 13:33) (16 - 18)  SpO2: 95% (01-21-21 @ 13:33)    PHYSICAL EXAM:  GENERAL: NAD, well-groomed, well-developed  HEAD:  Atraumatic, Normocephalic  EYES: EOMI, PERRLA, conjunctiva and sclera clear  ENMT: Moist mucous membranes  NECK: neck collar in place  RESPIRATORY: Clear to auscultation bilaterally; No rales, rhonchi, wheezing, or rubs  CARDIOVASCULAR: Regular rate and rhythm; No murmurs, rubs, or gallops  GASTROINTESTINAL: Soft, Nontender, Nondistended; Bowel sounds present  GENITOURINARY: Not examined  EXTREMITIES:  2+ Peripheral Pulses, No clubbing, cyanosis, or edema  NERVOUS SYSTEM:  Alert & Oriented X3; Moving all 4 extremities; No gross sensory deficits  HEME/LYMPH: No lymphadenopathy noted  SKIN: No rashes or lesions; Incisions C/D/I: +HV    LABS:              8.2                  137  | 26   | 21           5.44  >-----------< 135     ------------------------< 131                   25.4                 4.1  | 101  | 1.19                                         Ca 9.3   Mg x     Ph x                      RADIOLOGY & ADDITIONAL TESTS:  Imaging Personally Reviewed: [x] Yes    [ ] Consultant(s) Notes Reviewed:  [x] Care Discussed with Consultants/Other Providers: Orthopedic PA - discussed

## 2021-01-22 NOTE — DISCHARGE NOTE PROVIDER - HOSPITAL COURSE
Patient is a 74 yo female history of cervical spinal stenosis s/p elective C3-6 laminectomy/posterior spinal fusion with Dr. Cuevas on 1/19/2021. Patient tolerated the procedure well without any intraoperative complications. Patient tolerated physical therapy well and pain is controlled. Seen by medical attending for continuity of care and management and cleared for safe discharge. As per surgeon patient stable and ready for discharge. Please follow up with Dr. Cuevas.   in 1-2 weeks. Call office to make an appointment. Please follow up with your PMD for continuity of care and management as medications may have changed. Do not take any NSAIDS (motrin, advil, ibuprofren, aleve), Aspirin, Antiinflammatory medications unless instructed by your orthopaedic surgeon to continue. Avoid any heavy lifting, bending, squatting, twisting motion. Keep dressing/incision clean, dry, intact. Please remove outer dressing 48 hours after discharge from hospital. Any suture/staples to be removed on post op day # 14 at office visit. Weight bear as tolerated. Patient is a 76 yo female history of cervical spinal stenosis s/p elective C3-6 laminectomy/posterior spinal fusion with Dr. Cuevas on 1/19/2021. Patient tolerated the procedure well without any intraoperative complications. Patient tolerated physical therapy well and pain is controlled. Seen by medical attending for continuity of care and management and cleared for safe discharge. As per surgeon patient stable and ready for discharge. Please follow up with Dr. Cuevas in 1-2 weeks. Call office to make an appointment. Please follow up with your PMD for continuity of care and management as medications may have changed. Do not take any NSAIDS (motrin, advil, ibuprofren, aleve), Aspirin, Antiinflammatory medications unless instructed by your orthopaedic surgeon to continue. Avoid any heavy lifting, bending, squatting, twisting motion. Keep dressing/incision clean, dry, intact. Please remove outer dressing 48 hours after discharge from hospital. Any suture/staples to be removed on post op day # 14 at office visit. Weight bear as tolerated.

## 2021-01-22 NOTE — PROGRESS NOTE ADULT - PROBLEM SELECTOR PLAN 3
-BP on the softer side, improving today   -continue to hold HCTZ; c/w @ 75cc/hr and also decrease the opiate dose  -c/w lisinopril and metoprolol with hold parameters
-BP on the softer side  -continue to hold HCTZ; will place on NS @ 75cc/hr and also decrease the opiate dose  -c/w lisinopril and metoprolol.

## 2021-01-22 NOTE — DISCHARGE NOTE PROVIDER - NSDCCPTREATMENT_GEN_ALL_CORE_FT
PRINCIPAL PROCEDURE  Procedure: Decompression and instrumented fusion of cervical spine at multiple levels by posterior approach  Findings and Treatment: See dictated operative note

## 2021-01-22 NOTE — PROGRESS NOTE ADULT - PROVIDER SPECIALTY LIST ADULT
Anesthesia
Orthopedics
Pain Medicine
Pain Medicine
Orthopedics
Hospitalist
Hospitalist

## 2021-01-22 NOTE — PROGRESS NOTE ADULT - PROBLEM SELECTOR PLAN 1
-Pain well controlled; continue management and pain control per ortho recs with oxycodone IR and tylenol tid; Patient uses a specific fentanyl patch at home which is requesting to use. Unfortunately, pharmacy cannot verify her medication and we don't carry the specific brand. We are consulting pain management to see what can be done.   -given lower BP, would decrease Oxycodone 10mg IR to q3hrs with 2.5mg q3hrs prn  -f/u pain management consult  -c/w incentive spirometer use  -c/w PT.
-Pain well controlled; continue management and pain control per ortho recs with oxycodone IR and tylenol tid; Patient uses a specific fentanyl patch at home which is requesting to use. Unfortunately, pharmacy cannot verify her medication and we don't carry the specific brand. We are consulting pain management to see what can be done.   -given lower BP, would decrease Oxycodone 10mg IR to q3hrs with 2.5mg q3hrs prn  -f/u pain management consult  -c/w incentive spirometer use, PT

## 2021-01-22 NOTE — DISCHARGE NOTE PROVIDER - NSDCCPCAREPLAN_GEN_ALL_CORE_FT
PRINCIPAL DISCHARGE DIAGNOSIS  Diagnosis: Spinal stenosis, cervical region  Assessment and Plan of Treatment: Patient is a 74 yo female history of cervical spinal stenosis s/p elective C3-6 laminectomy/posterior spinal fusion with Dr. Cuevas on 1/19/2021. Patient tolerated the procedure well without any intraoperative complications. Patient tolerated physical therapy well and pain is controlled. Seen by medical attending for continuity of care and management and cleared for safe discharge. As per surgeon patient stable and ready for discharge. Please follow up with Dr. Cuevas.   in 1-2 weeks. Call office to make an appointment. Please follow up with your PMD for continuity of care and management as medications may have changed. Do not take any NSAIDS (motrin, advil, ibuprofren, aleve), Aspirin, Antiinflammatory medications unless instructed by your orthopaedic surgeon to continue. Avoid any heavy lifting, bending, squatting, twisting motion. Keep dressing/incision clean, dry, intact. Please remove outer dressing 48 hours after discharge from hospital. Any suture/staples to be removed on post op day # 14 at office visit. Weight bear as tolerated.       PRINCIPAL DISCHARGE DIAGNOSIS  Diagnosis: Spinal stenosis, cervical region  Assessment and Plan of Treatment: Patient is a 74 yo female history of cervical spinal stenosis s/p elective C3-6 laminectomy/posterior spinal fusion with Dr. Cuevas on 1/19/2021. Patient tolerated the procedure well without any intraoperative complications. Patient tolerated physical therapy well and pain is controlled. Seen by medical attending for continuity of care and management and cleared for safe discharge. As per surgeon patient stable and ready for discharge. Please follow up with Dr. Cuevas in 1-2 weeks. Call office to make an appointment. Please follow up with your PMD for continuity of care and management as medications may have changed. Do not take any NSAIDS (motrin, advil, ibuprofren, aleve), Aspirin, Antiinflammatory medications unless instructed by your orthopaedic surgeon to continue. Avoid any heavy lifting, bending, squatting, twisting motion. Keep dressing/incision clean, dry, intact. Please remove outer dressing 48 hours after discharge from hospital. Any suture/staples to be removed on post op day # 14 at office visit. Weight bear as tolerated.

## 2021-01-22 NOTE — DISCHARGE NOTE PROVIDER - NSDCMRMEDTOKEN_GEN_ALL_CORE_FT
fentaNYL 50 mcg/hr transdermal film, extended release: 0.5 film(s) transdermal every 2 days  hydroCHLOROthiazide 25 mg oral tablet: 1 tab(s) orally once a day  Ibrance 100 mg oral tablet: 1 tab(s) orally once a day x 21 days  lisinopril 10 mg oral tablet: 1 tab(s) orally once a day in AM   metoprolol succinate 100 mg oral tablet, extended release: 1 tab(s) orally once a day (at bedtime)  Multiple Vitamins oral capsule: 1 cap(s) orally once a day LD 1/12/21  potassium chloride 10 mEq oral capsule, extended release: 1 cap(s) orally once a day  vitamin D: 1 tab(s) orally once a day   acetaminophen 325 mg oral tablet: 3 tab(s) orally every 8 hours  cyclobenzaprine 5 mg oral tablet: 1 tab(s) orally every 8 hours as needed for muscle spasms MDD:3  fentaNYL 50 mcg/hr transdermal film, extended release: 0.5 film(s) transdermal every 2 days  gabapentin 100 mg oral capsule: 1 cap(s) orally 3 times a day MDD:3  hydroCHLOROthiazide 25 mg oral tablet: 1 tab(s) orally once a day  metoprolol succinate 100 mg oral tablet, extended release: 1 tab(s) orally once a day  Multiple Vitamins oral capsule: 1 cap(s) orally once a day LD 1/12/21  oxyCODONE 5 mg oral tablet: 1-2 tab(s) orally every 6 hours, As Needed moderate to severe pain MDD:8  pantoprazole 40 mg oral delayed release tablet: 1 tab(s) orally once a day (before a meal)  potassium chloride 10 mEq oral capsule, extended release: 1 cap(s) orally once a day  senna oral tablet: 2 tab(s) orally once a day (at bedtime)  vitamin D: 1 tab(s) orally once a day

## 2021-01-29 ENCOUNTER — NON-APPOINTMENT (OUTPATIENT)
Age: 76
End: 2021-01-29

## 2021-02-03 ENCOUNTER — APPOINTMENT (OUTPATIENT)
Dept: ORTHOPEDIC SURGERY | Facility: CLINIC | Age: 76
End: 2021-02-03

## 2021-02-08 ENCOUNTER — NON-APPOINTMENT (OUTPATIENT)
Age: 76
End: 2021-02-08

## 2021-02-09 PROBLEM — M48.02 SPINAL STENOSIS, CERVICAL REGION: Chronic | Status: ACTIVE | Noted: 2021-01-07

## 2021-02-09 PROBLEM — I10 ESSENTIAL (PRIMARY) HYPERTENSION: Chronic | Status: ACTIVE | Noted: 2021-01-07

## 2021-02-09 PROBLEM — C41.2 MALIGNANT NEOPLASM OF VERTEBRAL COLUMN: Chronic | Status: ACTIVE | Noted: 2021-01-07

## 2021-02-12 ENCOUNTER — NON-APPOINTMENT (OUTPATIENT)
Age: 76
End: 2021-02-12

## 2021-02-22 ENCOUNTER — APPOINTMENT (OUTPATIENT)
Dept: ORTHOPEDIC SURGERY | Facility: CLINIC | Age: 76
End: 2021-02-22
Payer: MEDICARE

## 2021-02-22 PROCEDURE — 99024 POSTOP FOLLOW-UP VISIT: CPT

## 2021-02-22 PROCEDURE — 72040 X-RAY EXAM NECK SPINE 2-3 VW: CPT

## 2021-02-22 NOTE — HISTORY OF PRESENT ILLNESS
[de-identified] : Ms. Altman presents to the office s/p posterior C3-6 fusion on 1/19/21.  Her right hand is much better.  She is now able to make a fist.

## 2021-02-22 NOTE — PHYSICAL EXAM
[Walker] : ambulates with walker [de-identified] : Her incision is healing well.  Stable neurologic exam. [de-identified] : AP lateral cervical x-rays reveals instrumented C3-6 fusion

## 2021-04-07 ENCOUNTER — APPOINTMENT (OUTPATIENT)
Dept: ORTHOPEDIC SURGERY | Facility: CLINIC | Age: 76
End: 2021-04-07
Payer: MEDICARE

## 2021-04-07 PROCEDURE — 99024 POSTOP FOLLOW-UP VISIT: CPT

## 2021-04-07 PROCEDURE — 72040 X-RAY EXAM NECK SPINE 2-3 VW: CPT

## 2021-04-07 NOTE — PHYSICAL EXAM
[de-identified] : Her incision is healing well.  Stable neurologic exam. [de-identified] : AP lateral cervical x-rays reveals instrumented C3-6 fusion

## 2021-04-07 NOTE — DISCUSSION/SUMMARY
[de-identified] : Overall she is recovered well.  Significant improvement in hand function.  She has some left-sided hip pain that will radiate but not below her knee.  She does have a history of a hip replacement outside.  We discussed further treatment options including a lumbar spine MRI to rule out any lumbar pathology but she declines at this point.  She will work on some home exercises.  Should her symptoms change or worsen she will let me know.  Otherwise follow-up for her cervical spine in 2 to 3 months.

## 2021-04-07 NOTE — HISTORY OF PRESENT ILLNESS
[de-identified] : Ms. Altman presents to the office s/p posterior C3-6 fusion on 1/19/21.  Her right hand function has significantly improved and her neck range of motion is better.  She is not taking any pain medications.  She complains of left lumbar pain.

## 2021-04-19 ENCOUNTER — FORM ENCOUNTER (OUTPATIENT)
Age: 76
End: 2021-04-19

## 2021-07-09 ENCOUNTER — APPOINTMENT (OUTPATIENT)
Dept: ORTHOPEDIC SURGERY | Facility: CLINIC | Age: 76
End: 2021-07-09

## 2021-07-19 ENCOUNTER — APPOINTMENT (OUTPATIENT)
Dept: ORTHOPEDIC SURGERY | Facility: CLINIC | Age: 76
End: 2021-07-19
Payer: MEDICARE

## 2021-07-19 DIAGNOSIS — M48.07 SPINAL STENOSIS, LUMBOSACRAL REGION: ICD-10-CM

## 2021-07-19 DIAGNOSIS — M43.16 SPONDYLOLISTHESIS, LUMBAR REGION: ICD-10-CM

## 2021-07-19 DIAGNOSIS — C79.51 SECONDARY MALIGNANT NEOPLASM OF BONE: ICD-10-CM

## 2021-07-19 PROCEDURE — 99214 OFFICE O/P EST MOD 30 MIN: CPT

## 2021-07-19 NOTE — DISCUSSION/SUMMARY
[de-identified] : We discussed further treatment options.  She will be obtaining a PET scan and a bone scan for evaluation of her metastatic disease.  Follow-up afterwards.

## 2021-07-19 NOTE — PHYSICAL EXAM
[Walker] : ambulates with walker [de-identified] : Her incision is healed.  Stable neurologic exam. [de-identified] : AP lateral cervical x-rays reveals instrumented C3-6 fusion\par \par review of her thoracic and lumbar MRIs does demonstrate some evidence of bony metastatic disease.  No obvious epidural extension.  She does has L4-5 spondylolisthesis and stenosis

## 2021-07-19 NOTE — HISTORY OF PRESENT ILLNESS
[de-identified] : Ms. Altman presents to the office s/p posterior C3-6 fusion on 1/19/21.  Her right hand function has significantly improved and her neck range of motion is better.  She is not taking any pain medications.  She complains of left lumbar pain that radiates into her left leg.  She has left shoulder pain and stiffness.  She was told she may have a lesion in her thoracic spine.

## 2021-07-28 ENCOUNTER — NON-APPOINTMENT (OUTPATIENT)
Age: 76
End: 2021-07-28

## 2021-08-13 ENCOUNTER — APPOINTMENT (OUTPATIENT)
Dept: ORTHOPEDIC SURGERY | Facility: CLINIC | Age: 76
End: 2021-08-13
Payer: MEDICARE

## 2021-08-13 DIAGNOSIS — M54.16 RADICULOPATHY, LUMBAR REGION: ICD-10-CM

## 2021-08-13 DIAGNOSIS — M51.36 OTHER INTERVERTEBRAL DISC DEGENERATION, LUMBAR REGION: ICD-10-CM

## 2021-08-13 PROCEDURE — 99214 OFFICE O/P EST MOD 30 MIN: CPT

## 2021-08-13 NOTE — DISCUSSION/SUMMARY
[de-identified] : We reviewed her PET scan report.  She has evidence of pathologic fracture.  Her main complaint now is back pain and some left leg pain.  I recommended a lumbar spine MRI with and without contrast.  We briefly discussed the possibility of a kyphoplasty with possible radiation treatment as well.  Follow-up after her MRI.

## 2021-08-13 NOTE — HISTORY OF PRESENT ILLNESS
[de-identified] : Ms. Altman presents to the office s/p posterior C3-6 fusion on 1/19/21.  She presents to the office with persistent back pain that is not improving.  Her metastatic disease has worsened.

## 2021-08-13 NOTE — PHYSICAL EXAM
[Walker] : ambulates with walker [de-identified] : Her incision is healed.  Stable neurologic exam. [de-identified] : AP lateral cervical x-rays reveals instrumented C3-6 fusion\par \par review of her thoracic and lumbar MRIs does demonstrate some evidence of bony metastatic disease.  No obvious epidural extension.  She does has L4-5 spondylolisthesis and stenosis\par \par Review of her updated PET scan does reveal increased uptake in multiple locations.  She also appears to have an L5 pathologic fracture.

## 2021-08-19 ENCOUNTER — NON-APPOINTMENT (OUTPATIENT)
Age: 76
End: 2021-08-19

## 2021-08-23 ENCOUNTER — APPOINTMENT (OUTPATIENT)
Dept: ORTHOPEDIC SURGERY | Facility: CLINIC | Age: 76
End: 2021-08-23

## 2022-01-19 ENCOUNTER — APPOINTMENT (OUTPATIENT)
Dept: ORTHOPEDIC SURGERY | Facility: CLINIC | Age: 77
End: 2022-01-19
Payer: MEDICARE

## 2022-01-19 VITALS — BODY MASS INDEX: 33.99 KG/M2 | WEIGHT: 180 LBS | HEIGHT: 61 IN

## 2022-01-19 DIAGNOSIS — M54.12 RADICULOPATHY, CERVICAL REGION: ICD-10-CM

## 2022-01-19 DIAGNOSIS — M48.02 SPINAL STENOSIS, CERVICAL REGION: ICD-10-CM

## 2022-01-19 PROCEDURE — 99214 OFFICE O/P EST MOD 30 MIN: CPT

## 2022-01-19 PROCEDURE — 72040 X-RAY EXAM NECK SPINE 2-3 VW: CPT

## 2022-01-19 NOTE — DISCUSSION/SUMMARY
[de-identified] : Symptomatology at this point appears more related to her left shoulder.  No radicular complaints.  I have recommended she have her shoulder evaluated.  Should her shoulder work-up be deemed normal I would recommend a cervical MRI.  Follow-up afterwards.

## 2022-01-19 NOTE — PHYSICAL EXAM
[Walker] : ambulates with walker [de-identified] : Her incision is healed.  Stable neurologic exam.  She does have limitations to passive range of motion of her left shoulder.  Symptomatology is reproduced with internal rotation. [de-identified] : AP lateral cervical x-rays reveals instrumented C3-6 fusion\par \par review of her thoracic and lumbar MRIs does demonstrate some evidence of bony metastatic disease.  No obvious epidural extension.  She does has L4-5 spondylolisthesis and stenosis\par \par Review of her updated PET scan does reveal increased uptake in multiple locations.  She also appears to have an L5 pathologic fracture.

## 2022-01-19 NOTE — HISTORY OF PRESENT ILLNESS
[de-identified] : Ms. Altman presents to the office s/p posterior C3-6 fusion on 1/19/21.  She presents with neck and left arm pain since falling mid December when she was in [place].  She went to the ER and was told nothing was fractured.

## 2023-02-28 NOTE — PROGRESS NOTE ADULT - PROBLEM SELECTOR PROBLEM 1
S/P laminectomy with spinal fusion
· Baseline Hgb 12 6 in Jan 2023  · Hgb on admission 8 6 --> dropped to 6 9  · Acute GIB/BRBPR  · Iron panel: Iron 43, Iron sat 24, TIBC 176, Ferritin 50   · S/P 1 unit PRBC 2/25  · Monitor H&H, Xarelto initially held  · Bleeding appears to have resolved    Discussed with gastroenterology, will resume Xarelto today and monitor
S/P laminectomy with spinal fusion

## 2024-01-04 NOTE — H&P PST ADULT - NS MD HP INPLANTS MED DEV
H&P reviewed. The patient was examined and there are no changes to the H&P.         left breast markers x1/Artificial joint/Lens implant left breast markers x2/Artificial joint/Lens implant